# Patient Record
Sex: FEMALE | Race: WHITE | NOT HISPANIC OR LATINO | Employment: FULL TIME | URBAN - METROPOLITAN AREA
[De-identification: names, ages, dates, MRNs, and addresses within clinical notes are randomized per-mention and may not be internally consistent; named-entity substitution may affect disease eponyms.]

---

## 2023-01-24 PROBLEM — M77.11 LATERAL EPICONDYLITIS OF RIGHT ELBOW: Status: ACTIVE | Noted: 2023-01-24

## 2023-01-24 PROBLEM — M75.21 BICEPS TENDINITIS OF RIGHT UPPER EXTREMITY: Status: ACTIVE | Noted: 2023-01-24

## 2023-01-24 PROBLEM — R03.0 ELEVATED BLOOD PRESSURE READING WITHOUT DIAGNOSIS OF HYPERTENSION: Status: ACTIVE | Noted: 2023-01-24

## 2023-01-24 PROBLEM — K43.9 VENTRAL HERNIA WITHOUT OBSTRUCTION OR GANGRENE: Status: ACTIVE | Noted: 2023-01-24

## 2023-01-24 PROBLEM — L70.0 CLOSED COMEDONE: Status: ACTIVE | Noted: 2023-01-24

## 2023-01-24 PROBLEM — M25.511 CHRONIC RIGHT SHOULDER PAIN: Status: ACTIVE | Noted: 2023-01-24

## 2023-01-24 PROBLEM — R00.0 SINUS TACHYCARDIA: Status: ACTIVE | Noted: 2023-01-24

## 2023-01-24 PROBLEM — N39.0 ACUTE UTI: Status: ACTIVE | Noted: 2023-01-24

## 2023-01-24 PROBLEM — F33.2 SEVERE EPISODE OF RECURRENT MAJOR DEPRESSIVE DISORDER, WITHOUT PSYCHOTIC FEATURES (MULTI): Status: ACTIVE | Noted: 2023-01-24

## 2023-01-24 PROBLEM — R19.8 ANAL SYMPTOMS: Status: ACTIVE | Noted: 2023-01-24

## 2023-01-24 PROBLEM — R30.0 DYSURIA: Status: ACTIVE | Noted: 2023-01-24

## 2023-01-24 PROBLEM — M89.8X1 PAIN OF LEFT CLAVICLE: Status: ACTIVE | Noted: 2023-01-24

## 2023-01-24 PROBLEM — K21.9 GERD (GASTROESOPHAGEAL REFLUX DISEASE): Status: ACTIVE | Noted: 2023-01-24

## 2023-01-24 PROBLEM — L25.5 RHUS DERMATITIS: Status: ACTIVE | Noted: 2023-01-24

## 2023-01-24 PROBLEM — F90.9 ATTENTION-DEFICIT/HYPERACTIVITY DISORDER: Status: ACTIVE | Noted: 2023-01-24

## 2023-01-24 PROBLEM — N95.1 HOT FLASHES, MENOPAUSAL: Status: ACTIVE | Noted: 2023-01-24

## 2023-01-24 PROBLEM — R79.89 LOW TSH LEVEL: Status: ACTIVE | Noted: 2023-01-24

## 2023-01-24 PROBLEM — G89.29 CHRONIC RIGHT SHOULDER PAIN: Status: ACTIVE | Noted: 2023-01-24

## 2023-01-24 PROBLEM — R63.5 WEIGHT GAIN: Status: ACTIVE | Noted: 2023-01-24

## 2023-01-24 RX ORDER — FAMOTIDINE 20 MG/1
1 TABLET, FILM COATED ORAL 2 TIMES DAILY
Status: ON HOLD | COMMUNITY
Start: 2021-09-15 | End: 2023-12-22 | Stop reason: WASHOUT

## 2023-01-24 RX ORDER — DEXTROAMPHETAMINE SACCHARATE, AMPHETAMINE ASPARTATE MONOHYDRATE, DEXTROAMPHETAMINE SULFATE AND AMPHETAMINE SULFATE 5; 5; 5; 5 MG/1; MG/1; MG/1; MG/1
20 CAPSULE, EXTENDED RELEASE ORAL DAILY
COMMUNITY
Start: 2014-03-05 | End: 2023-03-08 | Stop reason: ALTCHOICE

## 2023-01-24 RX ORDER — MULTIVITAMIN
TABLET ORAL
Status: ON HOLD | COMMUNITY
End: 2023-12-22 | Stop reason: WASHOUT

## 2023-01-24 RX ORDER — CONJUGATED ESTROGENS AND MEDROXYPROGESTERONE ACETATE .45; 1.5 MG/1; MG/1
1 TABLET, SUGAR COATED ORAL DAILY
COMMUNITY
Start: 2019-08-29 | End: 2023-03-08 | Stop reason: ALTCHOICE

## 2023-03-08 ENCOUNTER — OFFICE VISIT (OUTPATIENT)
Dept: PRIMARY CARE | Facility: CLINIC | Age: 58
End: 2023-03-08
Payer: COMMERCIAL

## 2023-03-08 VITALS
WEIGHT: 174 LBS | HEART RATE: 100 BPM | HEIGHT: 63 IN | SYSTOLIC BLOOD PRESSURE: 164 MMHG | BODY MASS INDEX: 30.83 KG/M2 | DIASTOLIC BLOOD PRESSURE: 100 MMHG

## 2023-03-08 DIAGNOSIS — F98.8 ATTENTION DEFICIT DISORDER (ADD) IN ADULT: Primary | ICD-10-CM

## 2023-03-08 DIAGNOSIS — Z12.31 VISIT FOR SCREENING MAMMOGRAM: ICD-10-CM

## 2023-03-08 DIAGNOSIS — R03.0 ELEVATED BLOOD PRESSURE READING IN OFFICE WITHOUT DIAGNOSIS OF HYPERTENSION: ICD-10-CM

## 2023-03-08 PROCEDURE — 99213 OFFICE O/P EST LOW 20 MIN: CPT | Performed by: INTERNAL MEDICINE

## 2023-03-08 RX ORDER — DEXMETHYLPHENIDATE HYDROCHLORIDE 10 MG/1
20 CAPSULE, EXTENDED RELEASE ORAL DAILY
Qty: 60 CAPSULE | Refills: 0 | Status: SHIPPED | OUTPATIENT
Start: 2023-03-08 | End: 2023-09-21 | Stop reason: RX

## 2023-03-08 NOTE — PROGRESS NOTES
"Subjective   Stephanie Jones is a 57 y.o. female who presents for follow up   [facility]  She has been off of her adderal for about 5 days  She feels like she is in a fog since she has been off her adderall  Mild headache, no dzziness      Review of Systems    Objective   BP (!) 164/100   Ht 1.588 m (5' 2.5\")   Wt 78.9 kg (174 lb)   BMI 31.32 kg/m²    Physical Exam  Her repeat bp with manual cuff in LUE was 130/90  HEENT: normal  CV: reg Si/S2 no murmur  LUNGS: CTAB    Assessment/Plan   ADD: chg to focalin ( dexmethylphenidate) 10 mg, can take one or two at one time.Her OARRS was reviewed and was  no discrepancy. A  CSA for amphetamine is in other chart from 01/19/2022,  and she has had a urine drug screen that had appropriate findings from 11/10/2022. I am going to do an ageement in [facility] at her next visit.  2,. Elev bp w/o htn: check bp periodialy. Needs to get labs done that have already been ordered.   Problem List Items Addressed This Visit    None         "

## 2023-03-08 NOTE — PATIENT INSTRUCTIONS
Begin Dexmethylphenidate ( focalin) .  You are to get 60 pills, so if the 2 of them seems too much you can just take one at at time.  50% of the dose is released in the first 2 hours and the remainder is released over about 8 to 10hours.  Try to check your blood pressures at home.   See me again in 4 months.  Please get blood tests done, after fasting.  Lab is open M-F 7 am to 4 pm and Sat 8am to 12 pm  No appointment is needed

## 2023-07-06 ENCOUNTER — LAB (OUTPATIENT)
Dept: LAB | Facility: LAB | Age: 58
End: 2023-07-06
Payer: COMMERCIAL

## 2023-07-06 ENCOUNTER — OFFICE VISIT (OUTPATIENT)
Dept: PRIMARY CARE | Facility: CLINIC | Age: 58
End: 2023-07-06
Payer: COMMERCIAL

## 2023-07-06 VITALS
BODY MASS INDEX: 32.6 KG/M2 | DIASTOLIC BLOOD PRESSURE: 90 MMHG | HEIGHT: 63 IN | SYSTOLIC BLOOD PRESSURE: 140 MMHG | HEART RATE: 88 BPM | WEIGHT: 184 LBS

## 2023-07-06 DIAGNOSIS — Z12.31 VISIT FOR SCREENING MAMMOGRAM: ICD-10-CM

## 2023-07-06 DIAGNOSIS — Z79.899 MEDICATION MANAGEMENT: ICD-10-CM

## 2023-07-06 DIAGNOSIS — R03.0 ELEVATED BLOOD PRESSURE READING IN OFFICE WITHOUT DIAGNOSIS OF HYPERTENSION: ICD-10-CM

## 2023-07-06 DIAGNOSIS — R63.5 WEIGHT GAIN: ICD-10-CM

## 2023-07-06 DIAGNOSIS — R15.9 FUNCTIONAL FECAL INCONTINENCE: ICD-10-CM

## 2023-07-06 DIAGNOSIS — R14.0 ABDOMINAL BLOATING: ICD-10-CM

## 2023-07-06 DIAGNOSIS — L30.9 ECZEMA, UNSPECIFIED TYPE: ICD-10-CM

## 2023-07-06 DIAGNOSIS — E55.9 VITAMIN D DEFICIENCY: ICD-10-CM

## 2023-07-06 DIAGNOSIS — K21.9 GASTROESOPHAGEAL REFLUX DISEASE, UNSPECIFIED WHETHER ESOPHAGITIS PRESENT: Primary | ICD-10-CM

## 2023-07-06 DIAGNOSIS — F98.8 ATTENTION DEFICIT DISORDER (ADD) IN ADULT: ICD-10-CM

## 2023-07-06 PROCEDURE — 80307 DRUG TEST PRSMV CHEM ANLYZR: CPT

## 2023-07-06 PROCEDURE — 80324 DRUG SCREEN AMPHETAMINES 1/2: CPT

## 2023-07-06 PROCEDURE — 36415 COLL VENOUS BLD VENIPUNCTURE: CPT

## 2023-07-06 PROCEDURE — 82306 VITAMIN D 25 HYDROXY: CPT

## 2023-07-06 PROCEDURE — 85025 COMPLETE CBC W/AUTO DIFF WBC: CPT

## 2023-07-06 PROCEDURE — 80061 LIPID PANEL: CPT

## 2023-07-06 PROCEDURE — 80053 COMPREHEN METABOLIC PANEL: CPT

## 2023-07-06 PROCEDURE — 84439 ASSAY OF FREE THYROXINE: CPT

## 2023-07-06 PROCEDURE — 84443 ASSAY THYROID STIM HORMONE: CPT

## 2023-07-06 PROCEDURE — 99214 OFFICE O/P EST MOD 30 MIN: CPT | Performed by: INTERNAL MEDICINE

## 2023-07-06 PROCEDURE — 1036F TOBACCO NON-USER: CPT | Performed by: INTERNAL MEDICINE

## 2023-07-06 RX ORDER — DEXTROAMPHETAMINE SACCHARATE, AMPHETAMINE ASPARTATE MONOHYDRATE, DEXTROAMPHETAMINE SULFATE AND AMPHETAMINE SULFATE 5; 5; 5; 5 MG/1; MG/1; MG/1; MG/1
20 CAPSULE, EXTENDED RELEASE ORAL EVERY MORNING
Qty: 30 CAPSULE | Refills: 0 | Status: SHIPPED | OUTPATIENT
Start: 2023-07-06 | End: 2023-09-21 | Stop reason: SDUPTHER

## 2023-07-06 RX ORDER — OMEPRAZOLE 20 MG/1
20 TABLET, DELAYED RELEASE ORAL
Status: ON HOLD | COMMUNITY
End: 2023-12-22 | Stop reason: WASHOUT

## 2023-07-06 RX ORDER — TRIAMCINOLONE ACETONIDE 0.25 MG/G
1 CREAM TOPICAL 3 TIMES DAILY
Qty: 89 G | Refills: 0 | Status: SHIPPED | OUTPATIENT
Start: 2023-07-06

## 2023-07-06 RX ORDER — DEXTROAMPHETAMINE SACCHARATE, AMPHETAMINE ASPARTATE MONOHYDRATE, DEXTROAMPHETAMINE SULFATE AND AMPHETAMINE SULFATE 5; 5; 5; 5 MG/1; MG/1; MG/1; MG/1
20 CAPSULE, EXTENDED RELEASE ORAL EVERY MORNING
Qty: 30 CAPSULE | Refills: 0 | Status: SHIPPED | OUTPATIENT
Start: 2023-07-06 | End: 2023-07-06 | Stop reason: SDUPTHER

## 2023-07-06 ASSESSMENT — PATIENT HEALTH QUESTIONNAIRE - PHQ9
SUM OF ALL RESPONSES TO PHQ9 QUESTIONS 1 AND 2: 0
2. FEELING DOWN, DEPRESSED OR HOPELESS: NOT AT ALL
1. LITTLE INTEREST OR PLEASURE IN DOING THINGS: NOT AT ALL

## 2023-07-06 ASSESSMENT — PAIN SCALES - GENERAL: PAINLEVEL: 0-NO PAIN

## 2023-07-06 NOTE — PROGRESS NOTES
Subjective   Stephanie Jones is a 57 y.o. female who presents for Follow-up (4 mo follow up visit).    She was not able to get focalin.  She last took a med for ADD since march.  She has noticed decreased focus at work and would really like to restart it.     She is concerned that she has gained 10 pounds since seeing me     He has been having some fecal incontinence when she is more active  She has been using extra toilet paper to stop leakage  Will have leakage at inopportune times  Normally does not have formed stool  She is aware of when she needs to have a bm  She usually has a bm twice a day    OARRS:  Macey Almaguer, DO on 7/6/2023  3:06 PM  I have personally reviewed the OARRS report for Stephanie Jones. I have considered the risks of abuse, dependence, addiction and diversion and I believe that it is clinically appropriate for Stephanie Jones to be prescribed this medication    Is the patient prescribed a combination of a benzodiazepine and opioid?  No    Last Urine Drug Screen / ordered today: No  Recent Results (from the past 43445 hour(s))   Drug Screen, Urine With Reflex to Confirmation    Collection Time: 07/06/23  4:12 PM   Result Value Ref Range    DRUG SCREEN COMMENT URINE SEE BELOW     Amphetamine Screen, Urine PRESUMPTIVE NEGATIVE NEGATIVE    Barbiturate Screen, Urine PRESUMPTIVE NEGATIVE NEGATIVE    BENZODIAZEPINE (PRESENCE) IN URINE BY SCREEN METHOD PRESUMPTIVE NEGATIVE NEGATIVE    Cannabinoid Screen, Urine PRESUMPTIVE NEGATIVE NEGATIVE    Cocaine Screen, Urine PRESUMPTIVE NEGATIVE NEGATIVE    Fentanyl, Ur PRESUMPTIVE NEGATIVE NEGATIVE    Methadone Screen, Urine PRESUMPTIVE NEGATIVE NEGATIVE    Opiate Screen, Urine PRESUMPTIVE NEGATIVE NEGATIVE    Oxycodone Screen, Ur PRESUMPTIVE NEGATIVE NEGATIVE    PCP Screen, Urine PRESUMPTIVE NEGATIVE NEGATIVE   Amphetamine Confirm, Urine    Collection Time: 07/06/23  4:12 PM   Result Value Ref Range    Methamphetamine Quant, Ur <200 ng/mL    MDA, Urine <200  ng/mL    MDEA, Urine <200 ng/mL    Phentermine,Urine <200 ng/mL    Amphetamines,Urine <50 ng/mL    MDMA, Urine <200 ng/mL   OPIATE/OPIOID/BENZO PRESCRIPTION COMPLIANCE    Collection Time: 09/07/21  2:52 PM   Result Value Ref Range    DRUG SCREEN COMMENT URINE SEE BELOW     Creatine, Urine 104.2 mg/dL    Amphetamine Screen, Urine PRESUMPTIVE POSITIVE (A) NEGATIVE    Barbiturate Screen, Urine PRESUMPTIVE NEGATIVE NEGATIVE    Cannabinoid Screen, Urine PRESUMPTIVE NEGATIVE NEGATIVE    Cocaine Screen, Urine PRESUMPTIVE NEGATIVE NEGATIVE    PCP Screen, Urine PRESUMPTIVE NEGATIVE NEGATIVE    7-Aminoclonazepam <25 Cutoff <25 ng/mL    Alpha-Hydroxyalprazolam <25 Cutoff <25 ng/mL    Alpha-Hydroxymidazolam <25 Cutoff <25 ng/mL    Alprazolam <25 Cutoff <25 ng/mL    Chlordiazepoxide <25 Cutoff <25 ng/mL    Clonazepam <25 Cutoff <25 ng/mL    Diazepam <25 Cutoff <25 ng/mL    Lorazepam <25 Cutoff <25 ng/mL    Midazolam <25 Cutoff <25 ng/mL    Nordiazepam <25 Cutoff <25 ng/mL    Oxazepam <25 Cutoff <25 ng/mL    Temazepam <25 Cutoff <25 ng/mL    Zolpidem <25 Cutoff <25 ng/mL    Zolpidem Metabolite (ZCA) <25 Cutoff <25 ng/mL    6-Acetylmorphine <25 Cutoff <25 ng/mL    Codeine <50 Cutoff <50 ng/mL    Hydrocodone <25 Cutoff <25 ng/mL    Hydromorphone <25 Cutoff <25 ng/mL    Morphine Urine <50 Cutoff <50 ng/mL    Norhydrocodone <25 Cutoff <25 ng/mL    Noroxycodone <25 Cutoff <25 ng/mL    Oxycodone <25 Cutoff <25 ng/mL    Oxymorphone <25 Cutoff <25 ng/mL    Tramadol <50 Cutoff <50 ng/mL    O-Desmethyltramadol <50 Cutoff <50 ng/mL    Fentanyl <2.5 Cutoff<2.5 ng/mL    Norfentanyl <2.5 Cutoff<2.5 ng/mL    METHADONE CONFIRMATION,URINE <25 Cutoff <25 ng/mL    EDDP <25 Cutoff <25 ng/mL     Results are as expected.     Controlled Substance Agreement:  Date of the Last Agreement: 7/6/2023  Reviewed Controlled Substance Agreement including but not limited to the benefits, risks, and alternatives to treatment with a Controlled Substance  "medication(s).    Stimulants:   What is the patient's goal of therapy? Tx of Adult ADHD  Is this being achieved with current treatment? yes    Activities of Daily Living:   Is your overall impression that this patient is benefiting (symptom reduction outweighs side effects) from stimulant therapy? Yes     1. Physical Functioning: Same  2. Family Relationship: Better  3. Social Relationship: Better  4. Mood: Better  5. Sleep Patterns: Same  6. Overall Function: Better      Review of Systems    Objective   /90 (BP Location: Left arm, Patient Position: Sitting, BP Cuff Size: Large adult)   Pulse 88   Ht 1.588 m (5' 2.5\")   Wt 83.5 kg (184 lb)   BMI 33.12 kg/m²    Physical Exam    Assessment/Plan   Problem List Items Addressed This Visit    None  Visit Diagnoses       Attention deficit disorder (ADD) in adult    rx for Adderall XR sent. CSA and UDS completed today.     Elevated blood pressure reading in office without diagnosis of hypertension  monitor at visit.   Abnormal result of thyroid function test: check thyroid ultrasound, as had been low previously as well.     Hypercalcemia: check PTH  Abdominal bloating: recommend increasing fiber and IB shelli. Refer to Dr Kirk Jaime GI  Esophageal reflux: recommended otc omeprazole one daily  Fecal incontinence: she is troubled by this. Increase fiber. Recommend discuss with dr jaime. May need other referral depending on progress.  See me in 2 mo.                "

## 2023-07-06 NOTE — PATIENT INSTRUCTIONS
I recommend trying IB Russel which is a peppermint oil capsule which calms the cut.  Restart omeprazole over the counter.  Could try Citrucel ( mehtlycelllose) that can add bulk to your stool with less gas.   Schedule with Dr Jaime for GI.    Call if you can't get the Adderal XR and I will send in the regular release form.    Use triamcinlone ointment for your feet.     Schedule mammogram.    See me in 2 months.

## 2023-07-07 LAB
ALANINE AMINOTRANSFERASE (SGPT) (U/L) IN SER/PLAS: 20 U/L (ref 7–45)
ALBUMIN (G/DL) IN SER/PLAS: 4.4 G/DL (ref 3.4–5)
ALKALINE PHOSPHATASE (U/L) IN SER/PLAS: 128 U/L (ref 33–110)
AMPHETAMINE (PRESENCE) IN URINE BY SCREEN METHOD: NORMAL
ANION GAP IN SER/PLAS: 15 MMOL/L (ref 10–20)
ASPARTATE AMINOTRANSFERASE (SGOT) (U/L) IN SER/PLAS: 19 U/L (ref 9–39)
BARBITURATES PRESENCE IN URINE BY SCREEN METHOD: NORMAL
BASOPHILS (10*3/UL) IN BLOOD BY AUTOMATED COUNT: 0.08 X10E9/L (ref 0–0.1)
BASOPHILS/100 LEUKOCYTES IN BLOOD BY AUTOMATED COUNT: 0.8 % (ref 0–2)
BENZODIAZEPINE (PRESENCE) IN URINE BY SCREEN METHOD: NORMAL
BILIRUBIN TOTAL (MG/DL) IN SER/PLAS: 0.6 MG/DL (ref 0–1.2)
CALCIDIOL (25 OH VITAMIN D3) (NG/ML) IN SER/PLAS: 40 NG/ML
CALCIUM (MG/DL) IN SER/PLAS: 11 MG/DL (ref 8.6–10.6)
CANNABINOIDS IN URINE BY SCREEN METHOD: NORMAL
CARBON DIOXIDE, TOTAL (MMOL/L) IN SER/PLAS: 27 MMOL/L (ref 21–32)
CHLORIDE (MMOL/L) IN SER/PLAS: 103 MMOL/L (ref 98–107)
CHOLESTEROL (MG/DL) IN SER/PLAS: 240 MG/DL (ref 0–199)
CHOLESTEROL IN HDL (MG/DL) IN SER/PLAS: 81.9 MG/DL
CHOLESTEROL/HDL RATIO: 2.9
COCAINE (PRESENCE) IN URINE BY SCREEN METHOD: NORMAL
CREATININE (MG/DL) IN SER/PLAS: 0.64 MG/DL (ref 0.5–1.05)
DRUG SCREEN COMMENT URINE: NORMAL
EOSINOPHILS (10*3/UL) IN BLOOD BY AUTOMATED COUNT: 0.26 X10E9/L (ref 0–0.7)
EOSINOPHILS/100 LEUKOCYTES IN BLOOD BY AUTOMATED COUNT: 2.7 % (ref 0–6)
ERYTHROCYTE DISTRIBUTION WIDTH (RATIO) BY AUTOMATED COUNT: 13.2 % (ref 11.5–14.5)
ERYTHROCYTE MEAN CORPUSCULAR HEMOGLOBIN CONCENTRATION (G/DL) BY AUTOMATED: 32.2 G/DL (ref 32–36)
ERYTHROCYTE MEAN CORPUSCULAR VOLUME (FL) BY AUTOMATED COUNT: 88 FL (ref 80–100)
ERYTHROCYTES (10*6/UL) IN BLOOD BY AUTOMATED COUNT: 5.42 X10E12/L (ref 4–5.2)
FENTANYL URINE: NORMAL
GFR FEMALE: >90 ML/MIN/1.73M2
GLUCOSE (MG/DL) IN SER/PLAS: 95 MG/DL (ref 74–99)
HEMATOCRIT (%) IN BLOOD BY AUTOMATED COUNT: 47.5 % (ref 36–46)
HEMOGLOBIN (G/DL) IN BLOOD: 15.3 G/DL (ref 12–16)
IMMATURE GRANULOCYTES/100 LEUKOCYTES IN BLOOD BY AUTOMATED COUNT: 0.2 % (ref 0–0.9)
LDL: 136 MG/DL (ref 0–99)
LEUKOCYTES (10*3/UL) IN BLOOD BY AUTOMATED COUNT: 9.6 X10E9/L (ref 4.4–11.3)
LYMPHOCYTES (10*3/UL) IN BLOOD BY AUTOMATED COUNT: 4.2 X10E9/L (ref 1.2–4.8)
LYMPHOCYTES/100 LEUKOCYTES IN BLOOD BY AUTOMATED COUNT: 44 % (ref 13–44)
METHADONE (PRESENCE) IN URINE BY SCREEN METHOD: NORMAL
MONOCYTES (10*3/UL) IN BLOOD BY AUTOMATED COUNT: 0.76 X10E9/L (ref 0.1–1)
MONOCYTES/100 LEUKOCYTES IN BLOOD BY AUTOMATED COUNT: 8 % (ref 2–10)
NEUTROPHILS (10*3/UL) IN BLOOD BY AUTOMATED COUNT: 4.23 X10E9/L (ref 1.2–7.7)
NEUTROPHILS/100 LEUKOCYTES IN BLOOD BY AUTOMATED COUNT: 44.3 % (ref 40–80)
NRBC (PER 100 WBCS) BY AUTOMATED COUNT: 0 /100 WBC (ref 0–0)
OPIATES (PRESENCE) IN URINE BY SCREEN METHOD: NORMAL
OXYCODONE (PRESENCE) IN URINE BY SCREEN METHOD: NORMAL
PHENCYCLIDINE (PRESENCE) IN URINE BY SCREEN METHOD: NORMAL
PLATELETS (10*3/UL) IN BLOOD AUTOMATED COUNT: 376 X10E9/L (ref 150–450)
POTASSIUM (MMOL/L) IN SER/PLAS: 5 MMOL/L (ref 3.5–5.3)
PROTEIN TOTAL: 7.2 G/DL (ref 6.4–8.2)
SODIUM (MMOL/L) IN SER/PLAS: 140 MMOL/L (ref 136–145)
THYROTROPIN (MIU/L) IN SER/PLAS BY DETECTION LIMIT <= 0.05 MIU/L: <0.01 MIU/L (ref 0.44–3.98)
THYROXINE (T4) FREE (NG/DL) IN SER/PLAS: 1.14 NG/DL (ref 0.78–1.48)
TRIGLYCERIDE (MG/DL) IN SER/PLAS: 109 MG/DL (ref 0–149)
UREA NITROGEN (MG/DL) IN SER/PLAS: 11 MG/DL (ref 6–23)
VLDL: 22 MG/DL (ref 0–40)

## 2023-07-10 LAB
AMPHETAMINES,URINE: <50 NG/ML
MDA,URINE: <200 NG/ML
MDEA,URINE: <200 NG/ML
MDMA,UR: <200 NG/ML
METHAMPHETAMINE QUANTITATIVE URINE: <200 NG/ML
PHENTERMINE,UR: <200 NG/ML

## 2023-07-12 ENCOUNTER — TELEPHONE (OUTPATIENT)
Dept: PRIMARY CARE | Facility: CLINIC | Age: 58
End: 2023-07-12
Payer: COMMERCIAL

## 2023-07-12 DIAGNOSIS — E83.52 SERUM CALCIUM ELEVATED: Primary | ICD-10-CM

## 2023-07-12 DIAGNOSIS — R79.89 LOW TSH LEVEL: ICD-10-CM

## 2023-07-12 NOTE — TELEPHONE ENCOUNTER
----- Message from Macey Almaguer DO sent at 7/12/2023 12:01 PM EDT -----  Call her and tell her that her blood tests show she might have HYPER thyroidism, which is where there is too much thyroid hormone. This can cause both fatigue and anxiety. I want her to get additonal blood testingn and also an ultrasound of her thyroid. Her calcium level was also slightly high, so I will check a blood test to investigate this. She should stop any Tums or calcium supplements. Her chlolesterol was also borderline high. We will let her know her test results when they come back. Give her the number to schedule her thyroid ultrasound.

## 2023-07-12 NOTE — TELEPHONE ENCOUNTER
Pt called back and I gave her message. Gave her the number to schedule thyroid ultrasound-[phone]-opt [phone]. She will be going to get her blood work done as well.   Pt did not have any questions or concerns.

## 2023-08-19 DIAGNOSIS — E04.1 THYROID NODULE GREATER THAN OR EQUAL TO 1.5 CM IN DIAMETER INCIDENTALLY NOTED ON IMAGING STUDY: Primary | ICD-10-CM

## 2023-08-21 ENCOUNTER — TELEPHONE (OUTPATIENT)
Dept: PRIMARY CARE | Facility: CLINIC | Age: 58
End: 2023-08-21
Payer: COMMERCIAL

## 2023-08-21 NOTE — TELEPHONE ENCOUNTER
----- Message from Macey Almaguer DO sent at 8/19/2023 10:10 PM EDT -----   Notify her that her thyroid ultrasound showed she has a few thyroid nodules. One of the nodules needs to be biopsied.  I am referring her to Dr Timothy Antonio, who is a surgeon, for the thyroid biopsy.

## 2023-08-30 ENCOUNTER — OFFICE (OUTPATIENT)
Dept: URBAN - METROPOLITAN AREA CLINIC 27 | Facility: CLINIC | Age: 58
End: 2023-08-30

## 2023-08-30 VITALS
HEIGHT: 62 IN | DIASTOLIC BLOOD PRESSURE: 92 MMHG | TEMPERATURE: 97.5 F | WEIGHT: 186 LBS | SYSTOLIC BLOOD PRESSURE: 146 MMHG | HEART RATE: 98 BPM

## 2023-08-30 DIAGNOSIS — R79.89 OTHER SPECIFIED ABNORMAL FINDINGS OF BLOOD CHEMISTRY: ICD-10-CM

## 2023-08-30 DIAGNOSIS — K21.9 GASTRO-ESOPHAGEAL REFLUX DISEASE WITHOUT ESOPHAGITIS: ICD-10-CM

## 2023-08-30 DIAGNOSIS — R14.0 ABDOMINAL DISTENSION (GASEOUS): ICD-10-CM

## 2023-08-30 PROCEDURE — 99203 OFFICE O/P NEW LOW 30 MIN: CPT | Performed by: INTERNAL MEDICINE

## 2023-09-11 VITALS
SYSTOLIC BLOOD PRESSURE: 126 MMHG | RESPIRATION RATE: 9 BRPM | HEIGHT: 62 IN | HEART RATE: 91 BPM | RESPIRATION RATE: 11 BRPM | HEART RATE: 101 BPM | HEART RATE: 93 BPM | RESPIRATION RATE: 26 BRPM | SYSTOLIC BLOOD PRESSURE: 118 MMHG | RESPIRATION RATE: 9 BRPM | RESPIRATION RATE: 11 BRPM | RESPIRATION RATE: 18 BRPM | HEART RATE: 89 BPM | DIASTOLIC BLOOD PRESSURE: 102 MMHG | OXYGEN SATURATION: 99 % | OXYGEN SATURATION: 95 % | HEART RATE: 91 BPM | OXYGEN SATURATION: 95 % | SYSTOLIC BLOOD PRESSURE: 160 MMHG | RESPIRATION RATE: 18 BRPM | HEART RATE: 91 BPM | HEART RATE: 89 BPM | DIASTOLIC BLOOD PRESSURE: 102 MMHG | HEART RATE: 88 BPM | HEART RATE: 82 BPM | SYSTOLIC BLOOD PRESSURE: 133 MMHG | OXYGEN SATURATION: 98 % | RESPIRATION RATE: 27 BRPM | DIASTOLIC BLOOD PRESSURE: 102 MMHG | SYSTOLIC BLOOD PRESSURE: 144 MMHG | HEART RATE: 82 BPM | RESPIRATION RATE: 16 BRPM | SYSTOLIC BLOOD PRESSURE: 207 MMHG | OXYGEN SATURATION: 98 % | SYSTOLIC BLOOD PRESSURE: 126 MMHG | OXYGEN SATURATION: 96 % | OXYGEN SATURATION: 96 % | HEART RATE: 107 BPM | HEART RATE: 81 BPM | HEART RATE: 101 BPM | HEART RATE: 101 BPM | RESPIRATION RATE: 27 BRPM | SYSTOLIC BLOOD PRESSURE: 126 MMHG | HEIGHT: 62 IN | SYSTOLIC BLOOD PRESSURE: 121 MMHG | DIASTOLIC BLOOD PRESSURE: 79 MMHG | DIASTOLIC BLOOD PRESSURE: 81 MMHG | HEART RATE: 82 BPM | SYSTOLIC BLOOD PRESSURE: 207 MMHG | TEMPERATURE: 98.8 F | OXYGEN SATURATION: 99 % | TEMPERATURE: 98.8 F | DIASTOLIC BLOOD PRESSURE: 80 MMHG | HEART RATE: 81 BPM | SYSTOLIC BLOOD PRESSURE: 121 MMHG | SYSTOLIC BLOOD PRESSURE: 169 MMHG | DIASTOLIC BLOOD PRESSURE: 135 MMHG | SYSTOLIC BLOOD PRESSURE: 160 MMHG | TEMPERATURE: 98.8 F | HEART RATE: 93 BPM | DIASTOLIC BLOOD PRESSURE: 80 MMHG | HEART RATE: 81 BPM | SYSTOLIC BLOOD PRESSURE: 144 MMHG | DIASTOLIC BLOOD PRESSURE: 79 MMHG | WEIGHT: 185 LBS | OXYGEN SATURATION: 94 % | SYSTOLIC BLOOD PRESSURE: 169 MMHG | RESPIRATION RATE: 16 BRPM | HEART RATE: 88 BPM | OXYGEN SATURATION: 94 % | OXYGEN SATURATION: 99 % | HEART RATE: 107 BPM | HEART RATE: 107 BPM | RESPIRATION RATE: 22 BRPM | SYSTOLIC BLOOD PRESSURE: 207 MMHG | SYSTOLIC BLOOD PRESSURE: 118 MMHG | RESPIRATION RATE: 16 BRPM | DIASTOLIC BLOOD PRESSURE: 135 MMHG | OXYGEN SATURATION: 95 % | SYSTOLIC BLOOD PRESSURE: 133 MMHG | DIASTOLIC BLOOD PRESSURE: 80 MMHG | WEIGHT: 185 LBS | RESPIRATION RATE: 27 BRPM | RESPIRATION RATE: 26 BRPM | DIASTOLIC BLOOD PRESSURE: 135 MMHG | HEART RATE: 89 BPM | WEIGHT: 185 LBS | SYSTOLIC BLOOD PRESSURE: 133 MMHG | SYSTOLIC BLOOD PRESSURE: 160 MMHG | RESPIRATION RATE: 9 BRPM | HEART RATE: 93 BPM | DIASTOLIC BLOOD PRESSURE: 81 MMHG | RESPIRATION RATE: 22 BRPM | SYSTOLIC BLOOD PRESSURE: 144 MMHG | OXYGEN SATURATION: 98 % | SYSTOLIC BLOOD PRESSURE: 118 MMHG | OXYGEN SATURATION: 94 % | SYSTOLIC BLOOD PRESSURE: 121 MMHG | DIASTOLIC BLOOD PRESSURE: 81 MMHG | HEART RATE: 88 BPM | RESPIRATION RATE: 26 BRPM | HEIGHT: 62 IN | DIASTOLIC BLOOD PRESSURE: 79 MMHG | RESPIRATION RATE: 18 BRPM | RESPIRATION RATE: 22 BRPM | SYSTOLIC BLOOD PRESSURE: 169 MMHG | OXYGEN SATURATION: 96 % | RESPIRATION RATE: 11 BRPM

## 2023-09-14 ENCOUNTER — AMBULATORY SURGICAL CENTER (OUTPATIENT)
Dept: URBAN - METROPOLITAN AREA SURGERY 12 | Facility: SURGERY | Age: 58
End: 2023-09-14

## 2023-09-14 ENCOUNTER — AMBULATORY SURGICAL CENTER (OUTPATIENT)
Dept: URBAN - METROPOLITAN AREA SURGERY 12 | Facility: SURGERY | Age: 58
End: 2023-09-14
Payer: COMMERCIAL

## 2023-09-14 ENCOUNTER — OFFICE (OUTPATIENT)
Dept: URBAN - METROPOLITAN AREA PATHOLOGY 2 | Facility: PATHOLOGY | Age: 58
End: 2023-09-14

## 2023-09-14 DIAGNOSIS — K21.9 GASTRO-ESOPHAGEAL REFLUX DISEASE WITHOUT ESOPHAGITIS: ICD-10-CM

## 2023-09-14 DIAGNOSIS — K29.80 DUODENITIS WITHOUT BLEEDING: ICD-10-CM

## 2023-09-14 DIAGNOSIS — K22.89 OTHER SPECIFIED DISEASE OF ESOPHAGUS: ICD-10-CM

## 2023-09-14 DIAGNOSIS — K44.9 DIAPHRAGMATIC HERNIA WITHOUT OBSTRUCTION OR GANGRENE: ICD-10-CM

## 2023-09-14 DIAGNOSIS — K31.7 POLYP OF STOMACH AND DUODENUM: ICD-10-CM

## 2023-09-14 DIAGNOSIS — R10.84 GENERALIZED ABDOMINAL PAIN: ICD-10-CM

## 2023-09-14 DIAGNOSIS — K29.50 UNSPECIFIED CHRONIC GASTRITIS WITHOUT BLEEDING: ICD-10-CM

## 2023-09-14 PROCEDURE — 43239 EGD BIOPSY SINGLE/MULTIPLE: CPT | Mod: 59 | Performed by: INTERNAL MEDICINE

## 2023-09-14 PROCEDURE — 43250 EGD CAUTERY TUMOR POLYP: CPT | Performed by: INTERNAL MEDICINE

## 2023-09-14 PROCEDURE — 88305 TISSUE EXAM BY PATHOLOGIST: CPT | Performed by: PATHOLOGY

## 2023-09-14 PROCEDURE — 88313 SPECIAL STAINS GROUP 2: CPT | Performed by: PATHOLOGY

## 2023-09-14 PROCEDURE — 88342 IMHCHEM/IMCYTCHM 1ST ANTB: CPT | Performed by: PATHOLOGY

## 2023-09-21 ENCOUNTER — OFFICE VISIT (OUTPATIENT)
Dept: PRIMARY CARE | Facility: CLINIC | Age: 58
End: 2023-09-21
Payer: COMMERCIAL

## 2023-09-21 VITALS
RESPIRATION RATE: 16 BRPM | BODY MASS INDEX: 34.08 KG/M2 | HEIGHT: 62 IN | WEIGHT: 185.2 LBS | DIASTOLIC BLOOD PRESSURE: 92 MMHG | SYSTOLIC BLOOD PRESSURE: 126 MMHG | HEART RATE: 105 BPM

## 2023-09-21 DIAGNOSIS — R79.89 LOW TSH LEVEL: ICD-10-CM

## 2023-09-21 DIAGNOSIS — F90.0 ATTENTION DEFICIT HYPERACTIVITY DISORDER (ADHD), PREDOMINANTLY INATTENTIVE TYPE: ICD-10-CM

## 2023-09-21 DIAGNOSIS — F98.8 ATTENTION DEFICIT DISORDER (ADD) IN ADULT: ICD-10-CM

## 2023-09-21 DIAGNOSIS — E04.1 THYROID NODULE: ICD-10-CM

## 2023-09-21 DIAGNOSIS — E83.52 HYPERCALCEMIA: Primary | ICD-10-CM

## 2023-09-21 DIAGNOSIS — R03.0 ELEVATED BLOOD PRESSURE READING WITHOUT DIAGNOSIS OF HYPERTENSION: ICD-10-CM

## 2023-09-21 PROCEDURE — 1036F TOBACCO NON-USER: CPT | Performed by: INTERNAL MEDICINE

## 2023-09-21 PROCEDURE — 99214 OFFICE O/P EST MOD 30 MIN: CPT | Performed by: INTERNAL MEDICINE

## 2023-09-21 RX ORDER — DEXTROAMPHETAMINE SACCHARATE, AMPHETAMINE ASPARTATE MONOHYDRATE, DEXTROAMPHETAMINE SULFATE AND AMPHETAMINE SULFATE 5; 5; 5; 5 MG/1; MG/1; MG/1; MG/1
20 CAPSULE, EXTENDED RELEASE ORAL EVERY MORNING
Qty: 30 CAPSULE | Refills: 0 | Status: SHIPPED | OUTPATIENT
Start: 2023-09-21 | End: 2023-11-17 | Stop reason: SDUPTHER

## 2023-09-21 ASSESSMENT — PATIENT HEALTH QUESTIONNAIRE - PHQ9
2. FEELING DOWN, DEPRESSED OR HOPELESS: NOT AT ALL
SUM OF ALL RESPONSES TO PHQ9 QUESTIONS 1 AND 2: 0
1. LITTLE INTEREST OR PLEASURE IN DOING THINGS: NOT AT ALL

## 2023-09-21 ASSESSMENT — PAIN SCALES - GENERAL: PAINLEVEL: 0-NO PAIN

## 2023-09-21 NOTE — PROGRESS NOTES
Subjective   Stephanie Jones is a 57 y.o. female who presents for Follow-up.    She saw dr brady and had an egd  She was to have a CT abdomen, needed to be rescheduled to next thrusday due to machine issue    She is scheduled for thyroid bx next week    Her daughter had hemorrhaging and she delivered 5 weeks early due to this   Was 6 lb / 6 oz; he is at NICU    OARRS:  Macey Almaguer DO on 9/21/2023  3:38 PM  I have personally reviewed the OARRS report for Stephanie Jones. I have considered the risks of abuse, dependence, addiction and diversion and I believe that it is clinically appropriate for Stephanie Jones to be prescribed this medication  OARRS reviewed 9/21/23  3:48 pm    Is the patient prescribed a combination of a benzodiazepine and opioid?  No    Last Urine Drug Screen / ordered today: Yes  Recent Results (from the past 8760 hour(s))   Drug Screen, Urine With Reflex to Confirmation    Collection Time: 07/06/23  4:12 PM   Result Value Ref Range    DRUG SCREEN COMMENT URINE SEE BELOW     Amphetamine Screen, Urine PRESUMPTIVE NEGATIVE NEGATIVE    Barbiturate Screen, Urine PRESUMPTIVE NEGATIVE NEGATIVE    BENZODIAZEPINE (PRESENCE) IN URINE BY SCREEN METHOD PRESUMPTIVE NEGATIVE NEGATIVE    Cannabinoid Screen, Urine PRESUMPTIVE NEGATIVE NEGATIVE    Cocaine Screen, Urine PRESUMPTIVE NEGATIVE NEGATIVE    Fentanyl, Ur PRESUMPTIVE NEGATIVE NEGATIVE    Methadone Screen, Urine PRESUMPTIVE NEGATIVE NEGATIVE    Opiate Screen, Urine PRESUMPTIVE NEGATIVE NEGATIVE    Oxycodone Screen, Ur PRESUMPTIVE NEGATIVE NEGATIVE    PCP Screen, Urine PRESUMPTIVE NEGATIVE NEGATIVE   Amphetamine Confirm, Urine    Collection Time: 07/06/23  4:12 PM   Result Value Ref Range    Methamphetamine Quant, Ur <200 ng/mL    MDA, Urine <200 ng/mL    MDEA, Urine <200 ng/mL    Phentermine,Urine <200 ng/mL    Amphetamines,Urine <50 ng/mL    MDMA, Urine <200 ng/mL     Results are as expected.         Controlled Substance Agreement:  Date of the Last  "Agreement: 7/6/2023  Reviewed Controlled Substance Agreement including but not limited to the benefits, risks, and alternatives to treatment with a Controlled Substance medication(s).    Stimulants:   What is the patient's goal of therapy? Treatment of ADD  Is this being achieved with current treatment? Yes. Med was unavailable for some time. She requests print.    Activities of Daily Living:   Is your overall impression that this patient is benefiting (symptom reduction outweighs side effects) from stimulant therapy? Yes     1. Physical Functioning: Same  2. Family Relationship: Same  3. Social Relationship: Same  4. Mood: Same  5. Sleep Patterns: Same  6. Overall Function: Same      Review of Systems    Objective   BP (!) 126/92 (BP Location: Left arm, Patient Position: Sitting, BP Cuff Size: Adult)   Pulse 105   Resp 16   Ht 1.575 m (5' 2\")   Wt 84 kg (185 lb 3.2 oz)   BMI 33.87 kg/m²    Physical Exam  Visit Vitals  BP (!) 126/92 (BP Location: Left arm, Patient Position: Sitting, BP Cuff Size: Adult)   Pulse 105   Resp 16   Ht 1.575 m (5' 2\")   Wt 84 kg (185 lb 3.2 oz)   BMI 33.87 kg/m²   Smoking Status Never   BSA 1.92 m²      GEN: NAD  HEENT: normal  NECK: no adenopathy, no thyroid enlargment  LUNGS: CTAB  CV: reg S1/S2 no murmurs  EXT: no leg edema   Assessment/Plan   Problem List Items Addressed This Visit       Low TSH level    Elevated blood pressure reading without diagnosis of hypertension     Other Visit Diagnoses       Hypercalcemia    -  recheck labs    Relevant Orders    Comprehensive Metabolic Panel    PTH, intact    Attention deficit disorder (ADD) in adult    she was given a printed RX to take due to availability  issues with this med.      Relevant Medications    amphetamine-dextroamphetamine XR (Adderall XR) 20 mg 24 hr capsule    Thyroid nodule    going to get biopsy.                "

## 2023-10-18 DIAGNOSIS — E04.2 MULTINODULAR THYROID: ICD-10-CM

## 2023-10-19 ENCOUNTER — OFFICE VISIT (OUTPATIENT)
Dept: PRIMARY CARE | Facility: CLINIC | Age: 58
End: 2023-10-19
Payer: COMMERCIAL

## 2023-10-19 VITALS
RESPIRATION RATE: 16 BRPM | HEIGHT: 62 IN | WEIGHT: 188 LBS | BODY MASS INDEX: 34.6 KG/M2 | DIASTOLIC BLOOD PRESSURE: 90 MMHG | HEART RATE: 85 BPM | SYSTOLIC BLOOD PRESSURE: 140 MMHG

## 2023-10-19 DIAGNOSIS — E83.52 HYPERCALCEMIA: Primary | ICD-10-CM

## 2023-10-19 DIAGNOSIS — F17.210 CIGARETTE NICOTINE DEPENDENCE WITHOUT COMPLICATION: ICD-10-CM

## 2023-10-19 DIAGNOSIS — E05.20 TOXIC MULTINODULAR GOITER: ICD-10-CM

## 2023-10-19 DIAGNOSIS — I10 BENIGN ESSENTIAL HTN: ICD-10-CM

## 2023-10-19 DIAGNOSIS — F17.210 CIGARETTE NICOTINE DEPENDENCE, UNCOMPLICATED: ICD-10-CM

## 2023-10-19 DIAGNOSIS — R91.8 LUNG NODULES: ICD-10-CM

## 2023-10-19 DIAGNOSIS — R73.09 ELEVATED GLUCOSE: ICD-10-CM

## 2023-10-19 PROCEDURE — 99214 OFFICE O/P EST MOD 30 MIN: CPT | Performed by: INTERNAL MEDICINE

## 2023-10-19 PROCEDURE — 3080F DIAST BP >= 90 MM HG: CPT | Performed by: INTERNAL MEDICINE

## 2023-10-19 PROCEDURE — 93000 ELECTROCARDIOGRAM COMPLETE: CPT | Performed by: INTERNAL MEDICINE

## 2023-10-19 PROCEDURE — 3077F SYST BP >= 140 MM HG: CPT | Performed by: INTERNAL MEDICINE

## 2023-10-19 PROCEDURE — 1036F TOBACCO NON-USER: CPT | Performed by: INTERNAL MEDICINE

## 2023-10-19 RX ORDER — LOSARTAN POTASSIUM 50 MG/1
50 TABLET ORAL DAILY
Qty: 30 TABLET | Refills: 2 | Status: SHIPPED | OUTPATIENT
Start: 2023-10-19 | End: 2024-02-22 | Stop reason: ALTCHOICE

## 2023-10-19 ASSESSMENT — PAIN SCALES - GENERAL: PAINLEVEL: 0-NO PAIN

## 2023-10-19 ASSESSMENT — PATIENT HEALTH QUESTIONNAIRE - PHQ9
1. LITTLE INTEREST OR PLEASURE IN DOING THINGS: NOT AT ALL
2. FEELING DOWN, DEPRESSED OR HOPELESS: NOT AT ALL
SUM OF ALL RESPONSES TO PHQ9 QUESTIONS 1 AND 2: 0

## 2023-10-19 NOTE — PATIENT INSTRUCTIONS
Schedule CT screening for lung cancer in the next 3 to 4 months.  No iv contrast, and lower dose of radiation.   Should have better coverage as is a screening test.    Get all the blood tests done that you need. My test is not a fasting test.    Begin losartan 50 mg one every morning with food.     Call me if you start to have face swelling or more pain call me and I can send in medication for you to treat infection until you can see a dentist.

## 2023-10-19 NOTE — PROGRESS NOTES
"Subjective   Stephanie Jones is a 57 y.o. female who presents for Follow-up (Pt following up with CT scan.).  [unfilled]    She stopped smoking 5 years ago  Had smoked her \"whole adult life\"  Nodule was 7 mm    She had a hard time getting things scheduled for her thyroid eval  Had been seen by Dr Antonio and felt to have subclinical hyperthyroidism  She was finally able to get a nuclear thyroid uptake scan scheduled for November  She did mention that her mother and her mother's father had thyroid removed      Review of Systems    Objective   /90 (BP Location: Left arm, Patient Position: Sitting, BP Cuff Size: Large adult)   Pulse 85   Resp 16   Ht 1.575 m (5' 2\")   Wt 85.3 kg (188 lb)   BMI 34.39 kg/m²    Physical Exam  Visit Vitals  /90 (BP Location: Left arm, Patient Position: Sitting, BP Cuff Size: Large adult)   Pulse 85   Resp 16   Ht 1.575 m (5' 2\")   Wt 85.3 kg (188 lb)   BMI 34.39 kg/m²   Smoking Status Never   BSA 1.93 m²      GEN: NAD  HEENT: normal  NECK: no adenopathy, no thyroid enlargment  LUNGS: CTAB  CV: reg S1/S2 no murmurs  EXT: no leg edema   Assessment/Plan   Problem List Items Addressed This Visit       Toxic multinodular goiter she is undergoing testing with nuclear thyroid scan through Dr Antonio.     Lung nodules 7mm nodule vs pleural tag in R posterior base. Will order CT screening of chest due to her longer smoking history.     Benign essential HTN begin losartan 50 mg daily.  EKG done as baseline was normal.     Relevant Medications    losartan (Cozaar) 50 mg tablet    Other Relevant Orders    ECG 12 lead (Clinic Performed) (Completed)    Ventral hernia:  her CT ordered for intermittent abdominal pain revealed  a 1.5 cm ventral hernia. She has had mesh in the past.      Other Visit Diagnoses       Hypercalcemia    -  Primary    Relevant Orders    Basic metabolic panel    PTH, intact    Cigarette nicotine dependence, uncomplicated        Relevant Orders    CT lung screening low " dose    Elevated glucose        Relevant Orders    Hemoglobin A1C (Completed)

## 2023-10-23 ENCOUNTER — LAB (OUTPATIENT)
Dept: LAB | Facility: LAB | Age: 58
End: 2023-10-23
Payer: COMMERCIAL

## 2023-10-23 ENCOUNTER — HOSPITAL ENCOUNTER (OUTPATIENT)
Dept: RADIOLOGY | Facility: CLINIC | Age: 58
Discharge: HOME | End: 2023-10-23
Payer: COMMERCIAL

## 2023-10-23 DIAGNOSIS — E04.2 NONTOXIC MULTINODULAR GOITER: ICD-10-CM

## 2023-10-23 DIAGNOSIS — R79.89 LOW TSH LEVEL: ICD-10-CM

## 2023-10-23 DIAGNOSIS — Z12.31 ENCOUNTER FOR SCREENING MAMMOGRAM FOR MALIGNANT NEOPLASM OF BREAST: ICD-10-CM

## 2023-10-23 DIAGNOSIS — R73.09 ELEVATED GLUCOSE: ICD-10-CM

## 2023-10-23 DIAGNOSIS — E83.52 SERUM CALCIUM ELEVATED: ICD-10-CM

## 2023-10-23 DIAGNOSIS — E83.52 HYPERCALCEMIA: ICD-10-CM

## 2023-10-23 DIAGNOSIS — R79.89 OTHER SPECIFIED ABNORMAL FINDINGS OF BLOOD CHEMISTRY: Primary | ICD-10-CM

## 2023-10-23 LAB
ALBUMIN SERPL BCP-MCNC: 4.6 G/DL (ref 3.4–5)
ALP SERPL-CCNC: 148 U/L (ref 33–110)
ALT SERPL W P-5'-P-CCNC: 17 U/L (ref 7–45)
ANION GAP SERPL CALC-SCNC: 14 MMOL/L (ref 10–20)
AST SERPL W P-5'-P-CCNC: 17 U/L (ref 9–39)
BILIRUB SERPL-MCNC: 0.7 MG/DL (ref 0–1.2)
BUN SERPL-MCNC: 11 MG/DL (ref 6–23)
CALCIUM SERPL-MCNC: 10.6 MG/DL (ref 8.6–10.6)
CHLORIDE SERPL-SCNC: 104 MMOL/L (ref 98–107)
CO2 SERPL-SCNC: 27 MMOL/L (ref 21–32)
CREAT SERPL-MCNC: 0.62 MG/DL (ref 0.5–1.05)
EST. AVERAGE GLUCOSE BLD GHB EST-MCNC: 117 MG/DL
GFR SERPL CREATININE-BSD FRML MDRD: >90 ML/MIN/1.73M*2
GLUCOSE SERPL-MCNC: 94 MG/DL (ref 74–99)
HBA1C MFR BLD: 5.7 %
POTASSIUM SERPL-SCNC: 4.4 MMOL/L (ref 3.5–5.3)
PROT SERPL-MCNC: 7.5 G/DL (ref 6.4–8.2)
PTH-INTACT SERPL-MCNC: 67.7 PG/ML (ref 18.5–88)
SODIUM SERPL-SCNC: 141 MMOL/L (ref 136–145)
T3FREE SERPL-MCNC: 5.7 PG/ML (ref 2.3–4.2)
THYROPEROXIDASE AB SERPL-ACNC: 43 IU/ML

## 2023-10-23 PROCEDURE — 86258 DGP ANTIBODY EACH IG CLASS: CPT

## 2023-10-23 PROCEDURE — 83970 ASSAY OF PARATHORMONE: CPT

## 2023-10-23 PROCEDURE — 83036 HEMOGLOBIN GLYCOSYLATED A1C: CPT

## 2023-10-23 PROCEDURE — 86376 MICROSOMAL ANTIBODY EACH: CPT

## 2023-10-23 PROCEDURE — 86364 TISS TRNSGLTMNASE EA IG CLAS: CPT

## 2023-10-23 PROCEDURE — 84445 ASSAY OF TSI GLOBULIN: CPT

## 2023-10-23 PROCEDURE — 77067 SCR MAMMO BI INCL CAD: CPT | Mod: BILATERAL PROCEDURE | Performed by: RADIOLOGY

## 2023-10-23 PROCEDURE — 77067 SCR MAMMO BI INCL CAD: CPT | Mod: 50

## 2023-10-23 PROCEDURE — 84481 FREE ASSAY (FT-3): CPT

## 2023-10-23 PROCEDURE — 77063 BREAST TOMOSYNTHESIS BI: CPT | Mod: BILATERAL PROCEDURE | Performed by: RADIOLOGY

## 2023-10-23 PROCEDURE — 36415 COLL VENOUS BLD VENIPUNCTURE: CPT

## 2023-10-23 PROCEDURE — 80053 COMPREHEN METABOLIC PANEL: CPT

## 2023-10-24 LAB
GLIADIN PEPTIDE IGA SER IA-ACNC: <1 U/ML
GLIADIN PEPTIDE IGG SER IA-ACNC: <1 U/ML
TTG IGA SER IA-ACNC: <1 U/ML
TTG IGG SER IA-ACNC: <1 U/ML

## 2023-10-26 LAB — TSI SER-ACNC: <1 TSI INDEX

## 2023-11-01 ENCOUNTER — HOSPITAL ENCOUNTER (OUTPATIENT)
Dept: RADIOLOGY | Facility: HOSPITAL | Age: 58
Discharge: HOME | End: 2023-11-01
Payer: COMMERCIAL

## 2023-11-01 DIAGNOSIS — E04.2 MULTINODULAR THYROID: ICD-10-CM

## 2023-11-01 PROCEDURE — 3430000001 HC RX 343 DIAGNOSTIC RADIOPHARMACEUTICALS: Performed by: SURGERY

## 2023-11-01 PROCEDURE — 78014 THYROID IMAGING W/BLOOD FLOW: CPT

## 2023-11-01 PROCEDURE — A9516 IODINE I-123 SOD IODIDE MIC: HCPCS | Performed by: SURGERY

## 2023-11-01 RX ORDER — SODIUM IODIDE I 123 200 UCI/1
391 CAPSULE, GELATIN COATED ORAL
Status: COMPLETED | OUTPATIENT
Start: 2023-11-01 | End: 2023-11-01

## 2023-11-01 RX ADMIN — SODIUM IODIDE I 123 400 MICROCURIE: 200 CAPSULE, GELATIN COATED ORAL at 11:46

## 2023-11-02 ENCOUNTER — HOSPITAL ENCOUNTER (OUTPATIENT)
Dept: RADIOLOGY | Facility: HOSPITAL | Age: 58
Discharge: HOME | End: 2023-11-02
Payer: COMMERCIAL

## 2023-11-02 PROCEDURE — 78014 THYROID IMAGING W/BLOOD FLOW: CPT | Performed by: RADIOLOGY

## 2023-11-03 PROBLEM — N39.0 ACUTE UTI: Status: RESOLVED | Noted: 2023-01-24 | Resolved: 2023-11-03

## 2023-11-03 PROBLEM — F33.2 SEVERE EPISODE OF RECURRENT MAJOR DEPRESSIVE DISORDER, WITHOUT PSYCHOTIC FEATURES (MULTI): Status: RESOLVED | Noted: 2023-01-24 | Resolved: 2023-11-03

## 2023-11-03 PROBLEM — N95.1 HOT FLASHES, MENOPAUSAL: Status: RESOLVED | Noted: 2023-01-24 | Resolved: 2023-11-03

## 2023-11-03 PROBLEM — R30.0 DYSURIA: Status: RESOLVED | Noted: 2023-01-24 | Resolved: 2023-11-03

## 2023-11-03 PROBLEM — I10 BENIGN ESSENTIAL HTN: Status: ACTIVE | Noted: 2023-01-24

## 2023-11-03 PROBLEM — I10 BENIGN ESSENTIAL HTN: Status: ACTIVE | Noted: 2023-11-03

## 2023-11-03 PROBLEM — R00.0 SINUS TACHYCARDIA: Status: RESOLVED | Noted: 2023-01-24 | Resolved: 2023-11-03

## 2023-11-03 PROBLEM — R91.8 LUNG NODULES: Status: ACTIVE | Noted: 2023-11-03

## 2023-11-03 PROBLEM — R63.5 WEIGHT GAIN: Status: RESOLVED | Noted: 2023-01-24 | Resolved: 2023-11-03

## 2023-11-03 PROBLEM — F17.200 NICOTINE DEPENDENCE, UNSPECIFIED, UNCOMPLICATED: Status: ACTIVE | Noted: 2023-11-03

## 2023-11-03 PROBLEM — E05.20 TOXIC MULTINODULAR GOITER: Status: ACTIVE | Noted: 2023-11-03

## 2023-11-09 ENCOUNTER — ANCILLARY PROCEDURE (OUTPATIENT)
Dept: RADIOLOGY | Facility: CLINIC | Age: 58
End: 2023-11-09
Payer: COMMERCIAL

## 2023-11-09 DIAGNOSIS — F17.210 CIGARETTE NICOTINE DEPENDENCE, UNCOMPLICATED: ICD-10-CM

## 2023-11-09 PROCEDURE — 71271 CT THORAX LUNG CANCER SCR C-: CPT

## 2023-11-09 PROCEDURE — 71271 CT THORAX LUNG CANCER SCR C-: CPT | Performed by: RADIOLOGY

## 2023-11-15 ENCOUNTER — TELEPHONE (OUTPATIENT)
Dept: ORTHOPEDIC SURGERY | Facility: HOSPITAL | Age: 58
End: 2023-11-15
Payer: COMMERCIAL

## 2023-11-15 NOTE — TELEPHONE ENCOUNTER
Patient was notified of thyroid uptake scan results showing toxic thyroid nodule left thyroid lobe.  We discussed options and she agreed with moving forward with left thyroid lobectomy.  We will work to get this scheduled for her.

## 2023-11-17 ENCOUNTER — PREP FOR PROCEDURE (OUTPATIENT)
Dept: ORTHOPEDICS | Facility: HOSPITAL | Age: 58
End: 2023-11-17
Payer: COMMERCIAL

## 2023-11-17 DIAGNOSIS — E05.10 TOXIC SOLITARY THYROID NODULE: Primary | ICD-10-CM

## 2023-11-17 DIAGNOSIS — F98.8 ATTENTION DEFICIT DISORDER (ADD) IN ADULT: ICD-10-CM

## 2023-11-17 NOTE — H&P
History Of Present Illness  Stephanie Jones is a 57 y.o. female presenting with toxic thyroid nodule left lobe.  Patient has had hyperthyroidism with suppressed TSH.  She underwent an iodine uptake scan with an iodine uptake greater than 50% consistent with hyperthyroidism.  She had significant uptake with a hot nodule in the left thyroid lobe and suppression of the right thyroid lobe consistent with a left-sided toxic nodule.     Past Medical History  She has a past medical history of Acute vaginitis (2018), Dyshidrosis (pompholyx) (06/10/2015), Fever, unspecified (2021), Nicotine dependence, unspecified, uncomplicated (01/15/2018), Personal history of diseases of the skin and subcutaneous tissue (2015), Personal history of other medical treatment, Personal history of other medical treatment, Personal history of other mental and behavioral disorders (06/10/2015), Superficial mycosis, unspecified (2017), and Urinary tract infection, site not specified (2019).    Surgical History  She has a past surgical history that includes Appendectomy (2014); Hernia repair (2014); Sinus surgery (2014); Rotator cuff repair (2014);  section, classic (2022); and Tonsillectomy (2017).     Social History  She reports that she has never smoked. She has never been exposed to tobacco smoke. She has never used smokeless tobacco. She reports current alcohol use of about 4.0 standard drinks of alcohol per week. She reports that she does not use drugs.    Family History  Family History   Problem Relation Name Age of Onset    Breast cancer Mother  60    Other (venous thromboembolism) Mother      Prostate cancer Father      Other (venous thromboembolism) Brother      Breast cancer Mother's Sister  60        Allergies  Patient has no known allergies.    Review of Systems     Physical ExamConstitutional - General appearance: In no acute distress, well appearing and well  nourished.   Eyes - No proptosis.   Neck - Palpable nodules left thyroid lobe each around 2 cm these move easily upon swallowing no fixation. Trachea is midline. I cannot palpate nodule on the right.   Pulmonary - Respiratory effort: Normal respiration. Auscultation of lungs: Clear to auscultation.   Cardiovascular - Auscultation of heart: Normal rate and rhythm, no murmurs. Carotid arteries exam: Pulse normal with no bruits. Examination of extremities for edema and/or varicosities: No peripheral edema.   Lymphatic - Palpation of lymph nodes: No lymphadenopathy.   Musculoskeletal - Digits and nails: Normal without clubbing or cyanosis. Muscle strength/tone: Normal.   Skin - Skin: Normal without rashes or lesions.   Neurologic - Cranial Nerve Exam: II - XII intact grossly. Coordination: Normal gait.   Psychiatric - Orientation to person, place, and time: Normal. Mood and affect: Normal.         Last Recorded Vitals  There were no vitals taken for this visit.    Relevant Results      Scheduled medications    Continuous medications    PRN medications    No results found for this or any previous visit (from the past 24 hour(s)).    Assessment/Plan       Left thyroid lobectomy at [facility] on December 22, 2023.             Timothy Antonio MD

## 2023-11-19 DIAGNOSIS — Z01.818 PREOPERATIVE TESTING: ICD-10-CM

## 2023-11-19 DIAGNOSIS — Z00.00 HEALTHCARE MAINTENANCE: ICD-10-CM

## 2023-11-19 DIAGNOSIS — E05.10 TOXIC SOLITARY THYROID NODULE: ICD-10-CM

## 2023-11-20 RX ORDER — DEXTROAMPHETAMINE SACCHARATE, AMPHETAMINE ASPARTATE MONOHYDRATE, DEXTROAMPHETAMINE SULFATE AND AMPHETAMINE SULFATE 5; 5; 5; 5 MG/1; MG/1; MG/1; MG/1
20 CAPSULE, EXTENDED RELEASE ORAL EVERY MORNING
Qty: 30 CAPSULE | Refills: 0 | Status: SHIPPED | OUTPATIENT
Start: 2023-11-20 | End: 2024-01-02 | Stop reason: SDUPTHER

## 2023-11-20 NOTE — TELEPHONE ENCOUNTER
Patient requesting refill of amphetamine/dextroamphetamine.  I personally reviewed the OARRS report for this patient and found no concern for abuse, dependence or diversion. Refill sent today.

## 2023-11-21 ENCOUNTER — TELEPHONE (OUTPATIENT)
Dept: SURGERY | Facility: CLINIC | Age: 58
End: 2023-11-21
Payer: COMMERCIAL

## 2023-11-21 NOTE — TELEPHONE ENCOUNTER
Call to patient. No answer. Left voicemail message confirming OR date 12/22/23. Notified patient she needs to complete labs before the date of surgery. Sent email to address on file with pre-op instructions, reqs and list of locations for lab appointments. Callback requested and number provided.

## 2023-12-14 ENCOUNTER — LAB (OUTPATIENT)
Dept: LAB | Facility: LAB | Age: 58
End: 2023-12-14
Payer: COMMERCIAL

## 2023-12-14 ENCOUNTER — OFFICE VISIT (OUTPATIENT)
Dept: PRIMARY CARE | Facility: CLINIC | Age: 58
End: 2023-12-14
Payer: COMMERCIAL

## 2023-12-14 VITALS
SYSTOLIC BLOOD PRESSURE: 136 MMHG | DIASTOLIC BLOOD PRESSURE: 100 MMHG | RESPIRATION RATE: 16 BRPM | BODY MASS INDEX: 32.42 KG/M2 | HEIGHT: 62 IN | WEIGHT: 176.2 LBS | HEART RATE: 88 BPM

## 2023-12-14 DIAGNOSIS — E05.10 TOXIC SOLITARY THYROID NODULE: ICD-10-CM

## 2023-12-14 DIAGNOSIS — I10 BENIGN ESSENTIAL HTN: Primary | ICD-10-CM

## 2023-12-14 DIAGNOSIS — F90.0 ATTENTION DEFICIT HYPERACTIVITY DISORDER (ADHD), PREDOMINANTLY INATTENTIVE TYPE: ICD-10-CM

## 2023-12-14 DIAGNOSIS — E05.90 HYPERTHYROIDISM: ICD-10-CM

## 2023-12-14 DIAGNOSIS — Z01.818 PREOPERATIVE TESTING: ICD-10-CM

## 2023-12-14 DIAGNOSIS — Z00.00 HEALTHCARE MAINTENANCE: ICD-10-CM

## 2023-12-14 PROCEDURE — 3075F SYST BP GE 130 - 139MM HG: CPT | Performed by: INTERNAL MEDICINE

## 2023-12-14 PROCEDURE — 80053 COMPREHEN METABOLIC PANEL: CPT

## 2023-12-14 PROCEDURE — 1036F TOBACCO NON-USER: CPT | Performed by: INTERNAL MEDICINE

## 2023-12-14 PROCEDURE — 3080F DIAST BP >= 90 MM HG: CPT | Performed by: INTERNAL MEDICINE

## 2023-12-14 PROCEDURE — 99214 OFFICE O/P EST MOD 30 MIN: CPT | Performed by: INTERNAL MEDICINE

## 2023-12-14 PROCEDURE — 36415 COLL VENOUS BLD VENIPUNCTURE: CPT

## 2023-12-14 PROCEDURE — 85730 THROMBOPLASTIN TIME PARTIAL: CPT

## 2023-12-14 PROCEDURE — 85610 PROTHROMBIN TIME: CPT

## 2023-12-14 PROCEDURE — 85027 COMPLETE CBC AUTOMATED: CPT

## 2023-12-14 ASSESSMENT — PATIENT HEALTH QUESTIONNAIRE - PHQ9
SUM OF ALL RESPONSES TO PHQ9 QUESTIONS 1 AND 2: 0
1. LITTLE INTEREST OR PLEASURE IN DOING THINGS: NOT AT ALL
2. FEELING DOWN, DEPRESSED OR HOPELESS: NOT AT ALL

## 2023-12-14 ASSESSMENT — PAIN SCALES - GENERAL: PAINLEVEL: 0-NO PAIN

## 2023-12-14 NOTE — PROGRESS NOTES
"Subjective   Stephanie Jones is a 58 y.o. female who presents for Follow-up.      She is going to have a thyroid lobectomy due to hyperfunctioning nodules next Friday by Dr Antonio.  Is outpatient.   She is not on any thyroid replacement right now, are hoping the non diseased side of her thyroid will take over.     She has been taking her bp at home: have been 130-140 over 80-90   She did not start losartan yet    She does not need a refill of adderall.     She has lost 11 pounds since last visit  She is trying a low salt and lower carbs     Review of Systems    Objective   BP (!) 136/100 (BP Location: Left arm, Patient Position: Sitting, BP Cuff Size: Adult)   Pulse 88   Resp 16   Ht 1.575 m (5' 2\")   Wt 79.9 kg (176 lb 3.2 oz)   BMI 32.23 kg/m²    Physical Exam       GEN: NAD  HEENT: normal  NECK: no adenopathy, no thyroid enlargment  LUNGS: CTAB  CV: reg S1/S2 no murmurs  EXT: no leg edema   Assessment/Plan   Problem List Items Addressed This Visit       Attention-deficit/hyperactivity disorder she did not need refill of adderall.     Benign essential HTN - Primary she says she will begin Losartan 50 mg daily. She should not take this on the morning of her surgery.     Toxic solitary thyroid nodule she is going to have this area resected by Dr Antonio     Other Visit Diagnoses       Hyperthyroidism    for surgery.     See me in February.                "

## 2023-12-14 NOTE — PATIENT INSTRUCTIONS
Begin taking LOSARTAN 50 mg once a day.   Don't take this on the morning of your surgery.   See me again in February.

## 2023-12-15 LAB
ALBUMIN SERPL BCP-MCNC: 4.5 G/DL (ref 3.4–5)
ALP SERPL-CCNC: 124 U/L (ref 33–110)
ALT SERPL W P-5'-P-CCNC: 16 U/L (ref 7–45)
ANION GAP SERPL CALC-SCNC: 14 MMOL/L (ref 10–20)
APTT PPP: 28 SECONDS (ref 27–38)
AST SERPL W P-5'-P-CCNC: 15 U/L (ref 9–39)
BILIRUB SERPL-MCNC: 0.5 MG/DL (ref 0–1.2)
BUN SERPL-MCNC: 12 MG/DL (ref 6–23)
CALCIUM SERPL-MCNC: 10.8 MG/DL (ref 8.6–10.6)
CHLORIDE SERPL-SCNC: 104 MMOL/L (ref 98–107)
CO2 SERPL-SCNC: 28 MMOL/L (ref 21–32)
CREAT SERPL-MCNC: 0.7 MG/DL (ref 0.5–1.05)
ERYTHROCYTE [DISTWIDTH] IN BLOOD BY AUTOMATED COUNT: 12.5 % (ref 11.5–14.5)
GFR SERPL CREATININE-BSD FRML MDRD: >90 ML/MIN/1.73M*2
GLUCOSE SERPL-MCNC: 99 MG/DL (ref 74–99)
HCT VFR BLD AUTO: 43.6 % (ref 36–46)
HGB BLD-MCNC: 14.3 G/DL (ref 12–16)
INR PPP: 1 (ref 0.9–1.1)
MCH RBC QN AUTO: 28.1 PG (ref 26–34)
MCHC RBC AUTO-ENTMCNC: 32.8 G/DL (ref 32–36)
MCV RBC AUTO: 86 FL (ref 80–100)
NRBC BLD-RTO: 0 /100 WBCS (ref 0–0)
PLATELET # BLD AUTO: 342 X10*3/UL (ref 150–450)
POTASSIUM SERPL-SCNC: 4.6 MMOL/L (ref 3.5–5.3)
PROT SERPL-MCNC: 7 G/DL (ref 6.4–8.2)
PROTHROMBIN TIME: 11.3 SECONDS (ref 9.8–12.8)
RBC # BLD AUTO: 5.09 X10*6/UL (ref 4–5.2)
SODIUM SERPL-SCNC: 141 MMOL/L (ref 136–145)
WBC # BLD AUTO: 9.6 X10*3/UL (ref 4.4–11.3)

## 2023-12-21 ENCOUNTER — ANESTHESIA EVENT (OUTPATIENT)
Dept: OPERATING ROOM | Facility: HOSPITAL | Age: 58
End: 2023-12-21
Payer: COMMERCIAL

## 2023-12-22 ENCOUNTER — HOSPITAL ENCOUNTER (OUTPATIENT)
Facility: HOSPITAL | Age: 58
Setting detail: OUTPATIENT SURGERY
Discharge: HOME | End: 2023-12-22
Attending: SURGERY | Admitting: SURGERY
Payer: COMMERCIAL

## 2023-12-22 ENCOUNTER — ANESTHESIA (OUTPATIENT)
Dept: OPERATING ROOM | Facility: HOSPITAL | Age: 58
End: 2023-12-22
Payer: COMMERCIAL

## 2023-12-22 VITALS
RESPIRATION RATE: 16 BRPM | DIASTOLIC BLOOD PRESSURE: 64 MMHG | TEMPERATURE: 98.6 F | SYSTOLIC BLOOD PRESSURE: 114 MMHG | BODY MASS INDEX: 31.52 KG/M2 | WEIGHT: 171.3 LBS | HEIGHT: 62 IN | OXYGEN SATURATION: 94 % | HEART RATE: 64 BPM

## 2023-12-22 DIAGNOSIS — E05.10 TOXIC SOLITARY THYROID NODULE: Primary | ICD-10-CM

## 2023-12-22 PROBLEM — F90.9 ADHD: Status: ACTIVE | Noted: 2023-12-22

## 2023-12-22 PROBLEM — E66.9 OBESITY: Status: ACTIVE | Noted: 2023-12-22

## 2023-12-22 PROCEDURE — 2720000007 HC OR 272 NO HCPCS: Performed by: SURGERY

## 2023-12-22 PROCEDURE — 7100000001 HC RECOVERY ROOM TIME - INITIAL BASE CHARGE: Performed by: SURGERY

## 2023-12-22 PROCEDURE — A60220 PR THYROID LOBECTOMY,UNILAT

## 2023-12-22 PROCEDURE — 7100000010 HC PHASE TWO TIME - EACH INCREMENTAL 1 MINUTE: Performed by: SURGERY

## 2023-12-22 PROCEDURE — 88307 TISSUE EXAM BY PATHOLOGIST: CPT | Mod: TC,SUR | Performed by: SURGERY

## 2023-12-22 PROCEDURE — 2500000004 HC RX 250 GENERAL PHARMACY W/ HCPCS (ALT 636 FOR OP/ED): Performed by: PHYSICAL THERAPIST

## 2023-12-22 PROCEDURE — 88307 TISSUE EXAM BY PATHOLOGIST: CPT | Performed by: PATHOLOGY

## 2023-12-22 PROCEDURE — 2500000005 HC RX 250 GENERAL PHARMACY W/O HCPCS: Performed by: ANESTHESIOLOGY

## 2023-12-22 PROCEDURE — 60220 PARTIAL REMOVAL OF THYROID: CPT | Performed by: SURGERY

## 2023-12-22 PROCEDURE — A60220 PR THYROID LOBECTOMY,UNILAT: Performed by: ANESTHESIOLOGY

## 2023-12-22 PROCEDURE — 2500000005 HC RX 250 GENERAL PHARMACY W/O HCPCS: Performed by: PHYSICAL THERAPIST

## 2023-12-22 PROCEDURE — 3600000009 HC OR TIME - EACH INCREMENTAL 1 MINUTE - PROCEDURE LEVEL FOUR: Performed by: SURGERY

## 2023-12-22 PROCEDURE — 7100000002 HC RECOVERY ROOM TIME - EACH INCREMENTAL 1 MINUTE: Performed by: SURGERY

## 2023-12-22 PROCEDURE — 2500000004 HC RX 250 GENERAL PHARMACY W/ HCPCS (ALT 636 FOR OP/ED): Performed by: SURGERY

## 2023-12-22 PROCEDURE — 7100000009 HC PHASE TWO TIME - INITIAL BASE CHARGE: Performed by: SURGERY

## 2023-12-22 PROCEDURE — A4217 STERILE WATER/SALINE, 500 ML: HCPCS | Performed by: SURGERY

## 2023-12-22 PROCEDURE — 2500000001 HC RX 250 WO HCPCS SELF ADMINISTERED DRUGS (ALT 637 FOR MEDICARE OP): Performed by: PHYSICAL THERAPIST

## 2023-12-22 PROCEDURE — 3700000001 HC GENERAL ANESTHESIA TIME - INITIAL BASE CHARGE: Performed by: SURGERY

## 2023-12-22 PROCEDURE — 3700000002 HC GENERAL ANESTHESIA TIME - EACH INCREMENTAL 1 MINUTE: Performed by: SURGERY

## 2023-12-22 PROCEDURE — 2500000005 HC RX 250 GENERAL PHARMACY W/O HCPCS: Performed by: SURGERY

## 2023-12-22 PROCEDURE — 3600000004 HC OR TIME - INITIAL BASE CHARGE - PROCEDURE LEVEL FOUR: Performed by: SURGERY

## 2023-12-22 RX ORDER — PROPOFOL 10 MG/ML
INJECTION, EMULSION INTRAVENOUS AS NEEDED
Status: DISCONTINUED | OUTPATIENT
Start: 2023-12-22 | End: 2023-12-22

## 2023-12-22 RX ORDER — ROCURONIUM BROMIDE 10 MG/ML
INJECTION, SOLUTION INTRAVENOUS AS NEEDED
Status: DISCONTINUED | OUTPATIENT
Start: 2023-12-22 | End: 2023-12-22

## 2023-12-22 RX ORDER — ONDANSETRON HYDROCHLORIDE 2 MG/ML
INJECTION, SOLUTION INTRAVENOUS AS NEEDED
Status: DISCONTINUED | OUTPATIENT
Start: 2023-12-22 | End: 2023-12-22

## 2023-12-22 RX ORDER — OXYCODONE HYDROCHLORIDE 5 MG/1
5 TABLET ORAL EVERY 4 HOURS PRN
Status: DISCONTINUED | OUTPATIENT
Start: 2023-12-22 | End: 2023-12-22 | Stop reason: HOSPADM

## 2023-12-22 RX ORDER — HYDROMORPHONE HYDROCHLORIDE 1 MG/ML
INJECTION, SOLUTION INTRAMUSCULAR; INTRAVENOUS; SUBCUTANEOUS AS NEEDED
Status: DISCONTINUED | OUTPATIENT
Start: 2023-12-22 | End: 2023-12-22

## 2023-12-22 RX ORDER — LIDOCAINE HYDROCHLORIDE 40 MG/ML
SOLUTION TOPICAL AS NEEDED
Status: DISCONTINUED | OUTPATIENT
Start: 2023-12-22 | End: 2023-12-22

## 2023-12-22 RX ORDER — SODIUM CHLORIDE 0.9 G/100ML
IRRIGANT IRRIGATION AS NEEDED
Status: DISCONTINUED | OUTPATIENT
Start: 2023-12-22 | End: 2023-12-22 | Stop reason: HOSPADM

## 2023-12-22 RX ORDER — TRAMADOL HYDROCHLORIDE 50 MG/1
50 TABLET ORAL EVERY 6 HOURS PRN
Qty: 10 TABLET | Refills: 0 | Status: SHIPPED | OUTPATIENT
Start: 2023-12-22 | End: 2023-12-29

## 2023-12-22 RX ORDER — DEXMEDETOMIDINE HYDROCHLORIDE 4 UG/ML
INJECTION, SOLUTION INTRAVENOUS CONTINUOUS PRN
Status: DISCONTINUED | OUTPATIENT
Start: 2023-12-22 | End: 2023-12-22

## 2023-12-22 RX ORDER — FENTANYL CITRATE 50 UG/ML
INJECTION, SOLUTION INTRAMUSCULAR; INTRAVENOUS AS NEEDED
Status: DISCONTINUED | OUTPATIENT
Start: 2023-12-22 | End: 2023-12-22

## 2023-12-22 RX ORDER — PHENYLEPHRINE HCL IN 0.9% NACL 0.4MG/10ML
SYRINGE (ML) INTRAVENOUS AS NEEDED
Status: DISCONTINUED | OUTPATIENT
Start: 2023-12-22 | End: 2023-12-22

## 2023-12-22 RX ORDER — ONDANSETRON HYDROCHLORIDE 2 MG/ML
4 INJECTION, SOLUTION INTRAVENOUS ONCE AS NEEDED
Status: DISCONTINUED | OUTPATIENT
Start: 2023-12-22 | End: 2023-12-22 | Stop reason: HOSPADM

## 2023-12-22 RX ORDER — HYDROMORPHONE HYDROCHLORIDE 1 MG/ML
0.4 INJECTION, SOLUTION INTRAMUSCULAR; INTRAVENOUS; SUBCUTANEOUS EVERY 5 MIN PRN
Status: DISCONTINUED | OUTPATIENT
Start: 2023-12-22 | End: 2023-12-22 | Stop reason: HOSPADM

## 2023-12-22 RX ORDER — METOPROLOL TARTRATE 1 MG/ML
INJECTION, SOLUTION INTRAVENOUS AS NEEDED
Status: DISCONTINUED | OUTPATIENT
Start: 2023-12-22 | End: 2023-12-22

## 2023-12-22 RX ORDER — WATER 1 ML/ML
IRRIGANT IRRIGATION AS NEEDED
Status: DISCONTINUED | OUTPATIENT
Start: 2023-12-22 | End: 2023-12-22 | Stop reason: HOSPADM

## 2023-12-22 RX ORDER — HYDROMORPHONE HYDROCHLORIDE 1 MG/ML
0.25 INJECTION, SOLUTION INTRAMUSCULAR; INTRAVENOUS; SUBCUTANEOUS EVERY 5 MIN PRN
Status: DISCONTINUED | OUTPATIENT
Start: 2023-12-22 | End: 2023-12-22 | Stop reason: HOSPADM

## 2023-12-22 RX ORDER — METHADONE HYDROCHLORIDE 10 MG/1
TABLET ORAL AS NEEDED
Status: DISCONTINUED | OUTPATIENT
Start: 2023-12-22 | End: 2023-12-22

## 2023-12-22 RX ORDER — LIDOCAINE HYDROCHLORIDE 20 MG/ML
INJECTION, SOLUTION INFILTRATION; PERINEURAL AS NEEDED
Status: DISCONTINUED | OUTPATIENT
Start: 2023-12-22 | End: 2023-12-22

## 2023-12-22 RX ORDER — GLYCOPYRROLATE 0.2 MG/ML
INJECTION INTRAMUSCULAR; INTRAVENOUS AS NEEDED
Status: DISCONTINUED | OUTPATIENT
Start: 2023-12-22 | End: 2023-12-22

## 2023-12-22 RX ORDER — BUPIVACAINE HYDROCHLORIDE 5 MG/ML
INJECTION, SOLUTION PERINEURAL AS NEEDED
Status: DISCONTINUED | OUTPATIENT
Start: 2023-12-22 | End: 2023-12-22 | Stop reason: HOSPADM

## 2023-12-22 RX ORDER — SODIUM CHLORIDE, SODIUM LACTATE, POTASSIUM CHLORIDE, CALCIUM CHLORIDE 600; 310; 30; 20 MG/100ML; MG/100ML; MG/100ML; MG/100ML
INJECTION, SOLUTION INTRAVENOUS CONTINUOUS PRN
Status: DISCONTINUED | OUTPATIENT
Start: 2023-12-22 | End: 2023-12-22

## 2023-12-22 RX ORDER — PANTOPRAZOLE SODIUM 40 MG/1
40 TABLET, DELAYED RELEASE ORAL
COMMUNITY
End: 2024-02-22 | Stop reason: SDUPTHER

## 2023-12-22 RX ORDER — SODIUM CHLORIDE, SODIUM LACTATE, POTASSIUM CHLORIDE, CALCIUM CHLORIDE 600; 310; 30; 20 MG/100ML; MG/100ML; MG/100ML; MG/100ML
100 INJECTION, SOLUTION INTRAVENOUS CONTINUOUS
Status: DISCONTINUED | OUTPATIENT
Start: 2023-12-22 | End: 2023-12-22 | Stop reason: HOSPADM

## 2023-12-22 RX ORDER — DEXAMETHASONE SODIUM PHOSPHATE 4 MG/ML
INJECTION, SOLUTION INTRA-ARTICULAR; INTRALESIONAL; INTRAMUSCULAR; INTRAVENOUS; SOFT TISSUE AS NEEDED
Status: DISCONTINUED | OUTPATIENT
Start: 2023-12-22 | End: 2023-12-22

## 2023-12-22 RX ORDER — MIDAZOLAM HYDROCHLORIDE 1 MG/ML
INJECTION INTRAMUSCULAR; INTRAVENOUS AS NEEDED
Status: DISCONTINUED | OUTPATIENT
Start: 2023-12-22 | End: 2023-12-22

## 2023-12-22 RX ADMIN — Medication 160 MCG: at 08:44

## 2023-12-22 RX ADMIN — Medication 4 L/MIN: at 09:45

## 2023-12-22 RX ADMIN — METOPROLOL TARTRATE 2 MG: 1 INJECTION, SOLUTION INTRAVENOUS at 07:37

## 2023-12-22 RX ADMIN — ROCURONIUM BROMIDE 50 MG: 10 INJECTION INTRAVENOUS at 07:31

## 2023-12-22 RX ADMIN — MIDAZOLAM HYDROCHLORIDE 1 MG: 1 INJECTION, SOLUTION INTRAMUSCULAR; INTRAVENOUS at 07:26

## 2023-12-22 RX ADMIN — DEXMEDETOMIDINE HYDROCHLORIDE 0.3 MCG/KG/HR: 4 INJECTION, SOLUTION INTRAVENOUS at 07:59

## 2023-12-22 RX ADMIN — FENTANYL CITRATE 25 MCG: 50 INJECTION, SOLUTION INTRAMUSCULAR; INTRAVENOUS at 07:28

## 2023-12-22 RX ADMIN — ROCURONIUM BROMIDE 10 MG: 10 INJECTION INTRAVENOUS at 08:45

## 2023-12-22 RX ADMIN — ONDANSETRON 4 MG: 2 INJECTION INTRAMUSCULAR; INTRAVENOUS at 09:06

## 2023-12-22 RX ADMIN — FENTANYL CITRATE 50 MCG: 50 INJECTION, SOLUTION INTRAMUSCULAR; INTRAVENOUS at 07:33

## 2023-12-22 RX ADMIN — MIDAZOLAM HYDROCHLORIDE 1 MG: 1 INJECTION, SOLUTION INTRAMUSCULAR; INTRAVENOUS at 07:22

## 2023-12-22 RX ADMIN — Medication 120 MCG: at 07:46

## 2023-12-22 RX ADMIN — DEXAMETHASONE SODIUM PHOSPHATE 10 MG: 4 INJECTION, SOLUTION INTRA-ARTICULAR; INTRALESIONAL; INTRAMUSCULAR; INTRAVENOUS; SOFT TISSUE at 07:42

## 2023-12-22 RX ADMIN — Medication 120 MCG: at 08:01

## 2023-12-22 RX ADMIN — Medication 160 MCG: at 09:36

## 2023-12-22 RX ADMIN — Medication 160 MCG: at 08:33

## 2023-12-22 RX ADMIN — Medication 120 MCG: at 09:17

## 2023-12-22 RX ADMIN — Medication 80 MCG: at 07:40

## 2023-12-22 RX ADMIN — SUGAMMADEX 200 MG: 100 INJECTION, SOLUTION INTRAVENOUS at 09:12

## 2023-12-22 RX ADMIN — SODIUM CHLORIDE, POTASSIUM CHLORIDE, SODIUM LACTATE AND CALCIUM CHLORIDE: 600; 310; 30; 20 INJECTION, SOLUTION INTRAVENOUS at 07:22

## 2023-12-22 RX ADMIN — LIDOCAINE HYDROCHLORIDE 60 MG: 20 INJECTION, SOLUTION INFILTRATION; PERINEURAL at 07:28

## 2023-12-22 RX ADMIN — GLYCOPYRROLATE 0.2 MG: 0.2 INJECTION, SOLUTION INTRAMUSCULAR; INTRAVENOUS at 08:37

## 2023-12-22 RX ADMIN — PROPOFOL 50 MG: 10 INJECTION, EMULSION INTRAVENOUS at 07:33

## 2023-12-22 RX ADMIN — HYDROMORPHONE HYDROCHLORIDE 0.4 MG: 1 INJECTION, SOLUTION INTRAMUSCULAR; INTRAVENOUS; SUBCUTANEOUS at 09:09

## 2023-12-22 RX ADMIN — PROPOFOL 150 MG: 10 INJECTION, EMULSION INTRAVENOUS at 07:29

## 2023-12-22 RX ADMIN — LIDOCAINE HYDROCHLORIDE 4 ML: 40 SOLUTION TOPICAL at 07:35

## 2023-12-22 RX ADMIN — FENTANYL CITRATE 25 MCG: 50 INJECTION, SOLUTION INTRAMUSCULAR; INTRAVENOUS at 07:37

## 2023-12-22 SDOH — HEALTH STABILITY: MENTAL HEALTH: CURRENT SMOKER: 0

## 2023-12-22 ASSESSMENT — PAIN - FUNCTIONAL ASSESSMENT
PAIN_FUNCTIONAL_ASSESSMENT: 0-10

## 2023-12-22 ASSESSMENT — COLUMBIA-SUICIDE SEVERITY RATING SCALE - C-SSRS
6. HAVE YOU EVER DONE ANYTHING, STARTED TO DO ANYTHING, OR PREPARED TO DO ANYTHING TO END YOUR LIFE?: NO
1. IN THE PAST MONTH, HAVE YOU WISHED YOU WERE DEAD OR WISHED YOU COULD GO TO SLEEP AND NOT WAKE UP?: NO
2. HAVE YOU ACTUALLY HAD ANY THOUGHTS OF KILLING YOURSELF?: NO

## 2023-12-22 ASSESSMENT — PAIN SCALES - GENERAL
PAINLEVEL_OUTOF10: 0 - NO PAIN

## 2023-12-22 NOTE — H&P
History Of Present Illness  Stephanie Jones is a 58 y.o. female presenting with toxic thyroid nodule left lobe.  Patient has had hyperthyroidism with suppressed TSH.  She underwent an iodine uptake scan with an iodine uptake greater than 50% consistent with hyperthyroidism.  She had significant uptake with a hot nodule in the left thyroid lobe and suppression of the right thyroid lobe consistent with a left-sided toxic nodule.     Past Medical History  She has a past medical history of Acute vaginitis (2018), Dyshidrosis (pompholyx) (06/10/2015), Fever, unspecified (2021), Nicotine dependence, unspecified, uncomplicated (01/15/2018), Personal history of diseases of the skin and subcutaneous tissue (2015), Personal history of other medical treatment, Personal history of other medical treatment, Personal history of other mental and behavioral disorders (06/10/2015), Superficial mycosis, unspecified (2017), and Urinary tract infection, site not specified (2019).    Surgical History  She has a past surgical history that includes Appendectomy (2014); Hernia repair (2014); Sinus surgery (2014); Rotator cuff repair (2014);  section, classic (2022); and Tonsillectomy (2017).     Social History  She reports that she has never smoked. She has never been exposed to tobacco smoke. She has never used smokeless tobacco. She reports current alcohol use of about 4.0 standard drinks of alcohol per week. She reports that she does not use drugs.    Family History  Family History   Problem Relation Name Age of Onset    Breast cancer Mother  60    Other (venous thromboembolism) Mother      Prostate cancer Father      Other (venous thromboembolism) Brother      Breast cancer Mother's Sister  60        Allergies  Patient has no known allergies.    Review of Systems     Physical ExamConstitutional - General appearance: In no acute distress, well appearing and well  "nourished.   Eyes - No proptosis.   Neck - Palpable nodules left thyroid lobe each around 2 cm these move easily upon swallowing no fixation. Trachea is midline. I cannot palpate nodule on the right.   Pulmonary - Respiratory effort: Normal respiration. Auscultation of lungs: Clear to auscultation.   Cardiovascular - Auscultation of heart: Normal rate and rhythm, no murmurs. Carotid arteries exam: Pulse normal with no bruits. Examination of extremities for edema and/or varicosities: No peripheral edema.   Lymphatic - Palpation of lymph nodes: No lymphadenopathy.   Musculoskeletal - Digits and nails: Normal without clubbing or cyanosis. Muscle strength/tone: Normal.   Skin - Skin: Normal without rashes or lesions.   Neurologic - Cranial Nerve Exam: II - XII intact grossly. Coordination: Normal gait.   Psychiatric - Orientation to person, place, and time: Normal. Mood and affect: Normal.         Last Recorded Vitals  Blood pressure 141/86, pulse 79, temperature 36.5 °C (97.7 °F), temperature source Temporal, resp. rate 16, height 1.575 m (5' 2\"), weight 77.7 kg (171 lb 4.8 oz), SpO2 98 %.    Relevant Results      Scheduled medications    Continuous medications    PRN medications    No results found for this or any previous visit (from the past 24 hour(s)).    Assessment/Plan       Left thyroid lobectomy at [facility] on December 22, 2023.             Favian Rowley MD    "

## 2023-12-22 NOTE — PROGRESS NOTES
Pharmacy Medication History Review    Stephanie Jones is a 58 y.o. female admitted for Toxic solitary thyroid nodule. Pharmacy reviewed the patient's hajwg-ed-jtrylmvdi medications and allergies for accuracy.    The list below reflects the updated PTA list. Comments regarding how patient may be taking medications differently can be found in the Admit Orders Activity  Prior to Admission Medications   Prescriptions Last Dose Informant Patient Reported?   amphetamine-dextroamphetamine XR (Adderall XR) 20 mg 24 hr capsule 12/21/2023 Self No   Sig: Take 1 capsule (20 mg) by mouth once daily in the morning. Do not crush or chew.   losartan (Cozaar) 50 mg tablet 12/20/2023 Self No   Sig: Take 1 tablet (50 mg) by mouth once daily.   pantoprazole (ProtoNix) 40 mg EC tablet Past Month Self Yes   Sig: Take 1 tablet (40 mg) by mouth once daily in the morning. Take before meals. Do not crush, chew, or split.   triamcinolone (Kenalog) 0.025 % cream Not Taking Self No   Sig: Apply 1 Application topically 3 times a day.   Patient not taking: Reported on 10/19/2023      Facility-Administered Medications: None        The list below reflects the updated allergy list. Please review each documented allergy for additional clarification and justification.  Allergies  Reviewed by Juhi Martini [facility] on 12/22/2023   No Known Allergies         Patient declines M2B at discharge. Pharmacy has been updated to [facility] [place], [place], [place].    Sources used to confirm home medication list include: Patient interview, dispensing history, OARRS, PCP visit 12/14/23.    Below are additional concerns with the patient's PTA list.    Juhi Martini [facility]   Transitions of Care Pharmacist  [facility]  Please reach out via Secure Chat for questions, or if no response call [email or web address] or [facility]

## 2023-12-22 NOTE — ANESTHESIA POSTPROCEDURE EVALUATION
Patient: Stephanie Jones    Procedure Summary       Date: 12/22/23 Room / Location: [facility] OR 12 / [facility] OR    Anesthesia Start: 0715 Anesthesia Stop: 0959    Procedure: Lobectomy Thyroid (Left: Neck) Diagnosis:       Toxic solitary thyroid nodule      (Toxic solitary thyroid nodule [E05.10])    Surgeons: Timothy Antonio MD Responsible Provider: Enrike Miles DO    Anesthesia Type: general ASA Status: 2            Anesthesia Type: general    Vitals Value Taken Time   /66 12/22/23 1300   Temp 36.5 °C (97.7 °F) 12/22/23 1200   Pulse 89 12/22/23 1303   Resp 20 12/22/23 1303   SpO2 94 % 12/22/23 1302   Vitals shown include unvalidated device data.    Anesthesia Post Evaluation    Patient location during evaluation: PACU  Patient participation: complete - patient participated  Level of consciousness: awake and alert  Pain management: satisfactory to patient  Airway patency: patent  Cardiovascular status: acceptable and blood pressure returned to baseline  Respiratory status: acceptable  Hydration status: acceptable  Postoperative Nausea and Vomiting: none        There were no known notable events for this encounter.

## 2023-12-22 NOTE — ANESTHESIA PROCEDURE NOTES
Airway  Date/Time: 12/22/2023 7:35 AM  Urgency: elective      Staffing  Performed: JOVON   Authorized by: Enrike Miles DO    Performed by: GEORGE Jarquin-SOO  Patient location during procedure: OR    Indications and Patient Condition  Indications for airway management: anesthesia and airway protection  Spontaneous Ventilation: absent  Sedation level: deep  Preoxygenated: yes  Patient position: sniffing  Mask difficulty assessment: 1 - vent by mask  Planned trial extubation    Final Airway Details  Final airway type: endotracheal airway      Successful airway: ETT  Cuffed: yes   Successful intubation technique: direct laryngoscopy  Facilitating devices/methods: intubating stylet  Endotracheal tube insertion site: oral  Blade: Soledad  Blade size: #4  ETT size (mm): 7.0  Cormack-Lehane Classification: grade I - full view of glottis  Placement verified by: chest auscultation and capnometry   Measured from: teeth  ETT to teeth (cm): 20  Number of attempts at approach: 1  Ventilation between attempts: none  Number of other approaches attempted: 0

## 2023-12-22 NOTE — ANESTHESIA PREPROCEDURE EVALUATION
Patient: Stephanie Jones    Procedure Information       Date/Time: 23 0715    Procedure: Lobectomy Thyroid (Left)    Location: [facility] OR  [facility] OR    Surgeons: Timothy Antonio MD            Relevant Problems   Anesthesia (within normal limits)      Cardiovascular   (+) Benign essential HTN   (+) Pain of left clavicle      Endocrine   (+) Obesity   (+) Toxic multinodular goiter   (+) Toxic solitary thyroid nodule      GI   (+) GERD (gastroesophageal reflux disease)      /Renal (within normal limits)      Neuro/Psych   (+) ADHD      Pulmonary   (+) Lung nodules   (-) Recent URI      GI/Hepatic (within normal limits)      Eyes, Ears, Nose, and Throat (within normal limits)      Other   (+) Lateral epicondylitis of right elbow     Vitals:    23 0620   BP: 141/86   Pulse: 79   Resp: 16   Temp: 36.5 °C (97.7 °F)   SpO2: 98%       Past Surgical History:   Procedure Laterality Date    APPENDECTOMY  2014    Appendectomy     SECTION, CLASSIC  2022     Section    HERNIA REPAIR  2014    Hernia Repair    ROTATOR CUFF REPAIR  2014    Rotator Cuff Repair    SINUS SURGERY  2014    Sinus Surgery    TONSILLECTOMY  2017    Tonsillectomy     Past Medical History:   Diagnosis Date    Acute vaginitis 2018    Bacterial vaginosis    Dyshidrosis (pompholyx) 06/10/2015    Dyshidrotic eczema    Fever, unspecified 2021    Fever and chills    Nicotine dependence, unspecified, uncomplicated 01/15/2018    Tobacco dependence    Personal history of diseases of the skin and subcutaneous tissue 2015    History of acne    Personal history of other medical treatment     History of EKG    Personal history of other medical treatment     H/O mammogram    Personal history of other mental and behavioral disorders 06/10/2015    History of depression    Superficial mycosis, unspecified 2017    Fungal infection of skin    Urinary tract infection, site  not specified 08/29/2019    Acute UTI     No current facility-administered medications for this encounter.  Prior to Admission medications    Medication Sig Start Date End Date Taking? Authorizing Provider   losartan (Cozaar) 50 mg tablet Take 1 tablet (50 mg) by mouth once daily. 10/19/23  Yes Macey Almaguer, DO   amphetamine-dextroamphetamine XR (Adderall XR) 20 mg 24 hr capsule Take 1 capsule (20 mg) by mouth once daily in the morning. Do not crush or chew. 11/20/23 12/26/23  Macey Almaguer DO   pantoprazole (ProtoNix) 40 mg EC tablet Take 1 tablet (40 mg) by mouth once daily in the morning. Take before meals. Do not crush, chew, or split.    Historical Provider, MD   triamcinolone (Kenalog) 0.025 % cream Apply 1 Application topically 3 times a day.  Patient not taking: Reported on 10/19/2023 7/6/23   Macey Almaguer DO   famotidine (Pepcid) 20 mg tablet Take 1 tablet (20 mg) by mouth 2 times a day. 9/15/21 12/22/23  Historical Provider, MD   multivitamin tablet Take by mouth.  12/22/23  Historical Provider, MD   omeprazole OTC (PriLOSEC OTC) 20 mg EC tablet Take 1 tablet (20 mg) by mouth once daily in the morning. Take before meals. Do not crush, chew, or split.  12/22/23  Historical Provider, MD     No Known Allergies  Social History     Tobacco Use    Smoking status: Never     Passive exposure: Never    Smokeless tobacco: Never   Substance Use Topics    Alcohol use: Yes     Alcohol/week: 4.0 standard drinks of alcohol     Types: 4 Cans of beer per week         Chemistry    Lab Results   Component Value Date/Time     12/14/2023 1530    K 4.6 12/14/2023 1530     12/14/2023 1530    CO2 28 12/14/2023 1530    BUN 12 12/14/2023 1530    CREATININE 0.70 12/14/2023 1530    Lab Results   Component Value Date/Time    CALCIUM 10.8 (H) 12/14/2023 1530    ALKPHOS 124 (H) 12/14/2023 1530    AST 15 12/14/2023 1530    ALT 16 12/14/2023 1530    BILITOT 0.5 12/14/2023 1530          Lab Results   Component Value  Date/Time    WBC 9.6 12/14/2023 1530    HGB 14.3 12/14/2023 1530    HCT 43.6 12/14/2023 1530     12/14/2023 1530     Lab Results   Component Value Date/Time    PROTIME 11.3 12/14/2023 1530    INR 1.0 12/14/2023 1530     Encounter Date: 10/19/23   ECG 12 lead (Clinic Performed)    Narrative    Normal sinus. Normal EKG         Clinical information reviewed:   Tobacco  Allergies  Meds   Med Hx  Surg Hx   Fam Hx  Soc Hx        NPO Detail:  NPO/Void Status  Date of Last Liquid: 12/21/23  Time of Last Liquid: 2330  Date of Last Solid: 12/21/23  Time of Last Solid: 2330         Physical Exam    Airway  Mallampati: I  TM distance: >3 FB  Neck ROM: full     Cardiovascular   Rhythm: regular     Dental        Pulmonary   Breath sounds clear to auscultation     Abdominal            Anesthesia Plan    ASA 2     general   (GETA, standard monitors, PIV)  The patient is not a current smoker.    intravenous induction   Postoperative administration of opioids is intended.  Trial extubation is planned.  Anesthetic plan and risks discussed with patient.  Use of blood products discussed with patient who consented to blood products.    Plan discussed with CRNA.

## 2023-12-22 NOTE — OP NOTE
Lobectomy Thyroid (L) Operative Note     Date: 2023  OR Location: [facility] OR    Name: Stephanie Jones, : 1965, Age: 58 y.o., MRN: [id number], Sex: female    Diagnosis  Pre-op Diagnosis     * Toxic solitary thyroid nodule [E05.10] Post-op Diagnosis     * Toxic solitary thyroid nodule [E05.10]     Procedures  Lobectomy Thyroid  74489 - ND TOTAL THYROID LOBECTOMY UNI W/WO ISTHMUSECTOMY      Surgeons      * Timothy Antonio - Primary    Resident/Fellow/Other Assistant:  Surgeon(s) and Role:     * Favian Rowley MD - Resident - Assisting    Procedure Summary  Anesthesia: General  ASA: II  Anesthesia Staff: Anesthesiologist: Enrike Miles DO  CRNA: QUINN Bernardo  C-AA: JOVANNA Pollack  SRNA: QUINN Jarquin  Estimated Blood Loss: 5mL  Intra-op Medications:   Medication Name Total Dose   sodium chloride 0.9 % irrigation solution 1,000 mL   BUPivacaine HCl (Marcaine) 0.5 % (5 mg/mL) injection 10 mL   sterile water irrigation solution 1,000 mL   oxygen (O2) therapy 80 L              Anesthesia Record               Intraprocedure I/O Totals          Intake    Dexmedetomidine 9.00 mL    The total shown is the total volume documented since Anesthesia Start was filed.    lactated Ringer's 600.00 mL    Total Intake 609 mL       Output    Est. Blood Loss 5 mL    Total Output 5 mL       Net    Net Volume 604 mL          Specimen:   ID Type Source Tests Collected by Time   1 : LEFT THYROID LOBE; DOUBLE-TAILED STITCH ON ISTHMUS, SINGLE-TAILED STITCH ON LEFT SUPERIOR POLE Tissue THYROID LOBECTOMY LEFT SURGICAL PATHOLOGY EXAM Timothy Antonio MD 2023 0852        Staff:   Circulator: Amadeo Parker RN; Abida Wagner RN  Scrub Person: Jose Antonio Aguirre         Drains and/or Catheters: * None in log *    Tourniquet Times:         Implants:     Findings: Left thyroid nodule well encapsulated.    Indications: Stephanie Jones is an 58 y.o. female who is having surgery for Toxic  solitary thyroid nodule [E05.10].  She was found to have a completely suppressed TSH at 0.01.  Ultrasound revealed 2.9 cm nodule left thyroid lobe.  Her TSI was negative for Graves' disease antithyroid peroxidase antibody level negative for Hashimoto's.  Thyroid uptake scan showed a toxic nodule left thyroid lobe with complete suppression of the right lobe as the source of her hyperthyroidism.  Therefore we discussed undergoing left thyroid lobectomy today.    The patient was seen in the preoperative area. The risks, benefits, complications, treatment options, non-operative alternatives, expected recovery and outcomes were discussed with the patient. The possibilities of reaction to medication, pulmonary aspiration, injury to surrounding structures, bleeding, recurrent infection, the need for additional procedures, failure to diagnose a condition, and creating a complication requiring transfusion or operation were discussed with the patient. The patient concurred with the proposed plan, giving informed consent.  The site of surgery was properly noted/marked if necessary per policy. The patient has been actively warmed in preoperative area. Preoperative antibiotics are not indicated. Venous thrombosis prophylaxis have been ordered including bilateral sequential compression devices    Procedure Details: The operative date patient's brought to the operating room.  After induction of anesthetic she is prepped and draped in the usual sterile fashion with a towel behind the shoulders and the neck gently extended.  She had SCDs on for DVT prophylaxis.    We made a 5 cm cervical collar incision 2 fingerbreadths above the sternal notch in a natural skin crease.  We divided down through platysma with Bovie electrocautery and raised subplatysmal flaps superiorly to the notch and the thyroid cartilage inferior to the sternal notch and laterally over the sternocleidomastoid muscles.  We then  the strap muscles in the  midline and retracted the left sternohyoid left sternothyroid muscles out laterally to level the carotid artery.    There was a dominant nodule occupying almost the entire left thyroid lobe.  It was soft and completely contained.  No extension to surrounding soft tissues or overlying strap musculature.  The middle thyroid vein was identified ligated and divided with 2-0 silk ties.  Next identified the cricothyroid space.  We went medial to lateral around the superior pole vessels with 2-0 and 3-0 silk ties proximally and the LigaSure device toward the specimen side.  We then went down and began taking branches of the inferior thyroid vein.  At about the 5 o'clock position was a normal-appearing left inferior parathyroid gland peeled away intact and normal vascular pedicle.  We then rolled the thyroid gland and medially.  The anesthesia team and placed an esophageal temperature probe which was palpable.  We dissected out into the tracheoesophageal groove identify the left recurrent laryngeal nerve.  There were branches of the inferior thyroid artery crossing anterior to the nerve these were divided with 3-0 silk ties and clips.  The LigaSure was not used in this area.  As we followed the nerve up at the 2 o'clock position was a normal-appearing left superior parathyroid gland peeled away intact and well-vascularized pedicle.    We continued following the recurrent laryngeal nerve.  It branched about 6 mm proximal to the thyroid musculature.  Both branches were identified and preserved.  We followed that this all the way up into the cricothyroid musculature and then rolled the thyroid gland up onto the trachea.  Nodules all well-contained to the posterior surface as well.  We divided behind the isthmus over the junction of the right thyroid lobe.  There was no abnormal central neck lymph nodes noted.  No pyramidal lobe noted.  We then divided the isthmus of junction of the right thyroid lobe with the LigaSure device  which gave good hemostasis and we removed the specimen.    Specimen was marked as left thyroid lobe with a double tailed suture on the isthmus single tailed suture on the left superior pole and sent off to pathology.  We then irrigated the neck out well and achieved hemostasis.  Recurrent nerve and branches were intact.  Parathyroids looked well-vascularized.  Overall hemostasis was good.  We used Isra powder for final hemostasis and then closed traps and platysma with 3-0 Vicryl skin with 4-0 Monocryl half percent Marcaine was used for postop pain control..  Dry sterile dressings were applied.  Patient tolerated procedure well with no complications.  Complications:  None; patient tolerated the procedure well.    Disposition: PACU - hemodynamically stable.  Condition: stable         Additional Details:     Attending Attestation: I was present and scrubbed for the entire procedure.    Timothy Antonio  Phone Number: [phone]

## 2023-12-22 NOTE — BRIEF OP NOTE
Date: 2023  OR Location: [place] OR    Name: Stephanie Jones, : 1965, Age: 58 y.o., MRN: [id number], Sex: female    Diagnosis  Pre-op Diagnosis     * Toxic solitary thyroid nodule [E05.10] Post-op Diagnosis     * Toxic solitary thyroid nodule [E05.10]     Procedures  Lobectomy Thyroid  67890 - CO TOTAL THYROID LOBECTOMY UNI W/WO ISTHMUSECTOMY    Left thyroid lobectomy  Surgeons      * Timothy Antonio - Primary    Resident/Fellow/Other Assistant:  Surgeon(s) and Role:     * Favian Rowley MD - Resident - Assisting    Procedure Summary  Anesthesia: General  ASA: II  Anesthesia Staff: Anesthesiologist: Enrike Miles DO  CRNA: QUINN Bernardo  C-AA: JOVANNA Pollack  SRNA: QUINN Jarquin  Estimated Blood Loss: 5mL  Intra-op Medications:   Medication Name Total Dose   sodium chloride 0.9 % irrigation solution 1,000 mL   BUPivacaine HCl (Marcaine) 0.5 % (5 mg/mL) injection 10 mL   sterile water irrigation solution 1,000 mL   oxygen (O2) therapy 80 L              Anesthesia Record               Intraprocedure I/O Totals          Intake    Dexmedetomidine 9.00 mL    The total shown is the total volume documented since Anesthesia Start was filed.    lactated Ringer's 600.00 mL    Total Intake 609 mL       Output    Est. Blood Loss 5 mL    Total Output 5 mL       Net    Net Volume 604 mL          Specimen:   ID Type Source Tests Collected by Time   1 : LEFT THYROID LOBE; DOUBLE-TAILED STITCH ON ISTHMUS, SINGLE-TAILED STITCH ON LEFT SUPERIOR POLE Tissue THYROID LOBECTOMY LEFT SURGICAL PATHOLOGY EXAM Timothy Antonio MD 2023 0852        Staff:   Circulator: Amadeo Parker RN; Abida Wagner RN  Scrub Person: Jose Antonio Aguirre          Findings: Nodule in the left thyroid, soft but significant size    Complications:  None; patient tolerated the procedure well.     Disposition: PACU - hemodynamically stable.  Condition: stable  Specimens Collected:   ID Type Source Tests  Collected by Time   1 : LEFT THYROID LOBE; DOUBLE-TAILED STITCH ON ISTHMUS, SINGLE-TAILED STITCH ON LEFT SUPERIOR POLE Tissue THYROID LOBECTOMY LEFT SURGICAL PATHOLOGY EXAM Timothy Antonio MD 12/22/2023 0852     Attending Attestation: I was present and scrubbed for the entire procedure.    Timothy Antonio  Phone Number: [phone]

## 2024-01-02 DIAGNOSIS — F98.8 ATTENTION DEFICIT DISORDER (ADD) IN ADULT: ICD-10-CM

## 2024-01-02 NOTE — TELEPHONE ENCOUNTER
Please refill.    amphetamine-dextroamphetamine XR (Adderall XR) 20 mg 24 hr capsule   Take 1 capsule (20 mg) by mouth once daily in the morning. Do not crush or chew   Drug Kyle  RAJANI

## 2024-01-03 RX ORDER — DEXTROAMPHETAMINE SACCHARATE, AMPHETAMINE ASPARTATE MONOHYDRATE, DEXTROAMPHETAMINE SULFATE AND AMPHETAMINE SULFATE 5; 5; 5; 5 MG/1; MG/1; MG/1; MG/1
20 CAPSULE, EXTENDED RELEASE ORAL EVERY MORNING
Qty: 30 CAPSULE | Refills: 0 | Status: SHIPPED | OUTPATIENT
Start: 2024-01-03 | End: 2024-02-05 | Stop reason: SDUPTHER

## 2024-01-03 NOTE — TELEPHONE ENCOUNTER
Last fill was 11/26/23 for amphetamine/dextroamphetamine. I personally reviewed the OARRS report for this patient and found no concern for abuse, dependence or diversion. Refill sent today.

## 2024-01-04 ENCOUNTER — OFFICE VISIT (OUTPATIENT)
Dept: SURGERY | Facility: CLINIC | Age: 59
End: 2024-01-04
Payer: COMMERCIAL

## 2024-01-04 ENCOUNTER — APPOINTMENT (OUTPATIENT)
Dept: SURGERY | Facility: CLINIC | Age: 59
End: 2024-01-04
Payer: COMMERCIAL

## 2024-01-04 VITALS
BODY MASS INDEX: 31.09 KG/M2 | DIASTOLIC BLOOD PRESSURE: 80 MMHG | WEIGHT: 170 LBS | SYSTOLIC BLOOD PRESSURE: 128 MMHG | HEART RATE: 118 BPM

## 2024-01-04 DIAGNOSIS — E89.0 STATUS POST PARTIAL THYROIDECTOMY: ICD-10-CM

## 2024-01-04 LAB
LABORATORY COMMENT REPORT: NORMAL
PATH REPORT.FINAL DX SPEC: NORMAL
PATH REPORT.GROSS SPEC: NORMAL
PATH REPORT.RELEVANT HX SPEC: NORMAL
PATH REPORT.TOTAL CANCER: NORMAL

## 2024-01-04 PROCEDURE — 99024 POSTOP FOLLOW-UP VISIT: CPT | Performed by: SURGERY

## 2024-01-04 PROCEDURE — 3079F DIAST BP 80-89 MM HG: CPT | Performed by: SURGERY

## 2024-01-04 PROCEDURE — 3074F SYST BP LT 130 MM HG: CPT | Performed by: SURGERY

## 2024-01-04 PROCEDURE — 1036F TOBACCO NON-USER: CPT | Performed by: SURGERY

## 2024-01-04 NOTE — PROGRESS NOTES
Subjective   Patient ID: Stephanie Jones is a 58 y.o. female who presents for postoperative visit after a left thyroid lobectomy December 22, 2023.    HPI Ms. Jones returns for her postop visit today.  She underwent a left thyroid lobectomy for a toxic thyroid nodule.  Final pathology is still pending.  She did well with the surgery and was discharged home as an outpatient.  As she only underwent a thyroid lobectomy no thyroid hormone replacement was initiated.    At this point, she feels good.  No signs or symptoms of hypothyroidism.  No neck pain no difficulty breathing or swallowing no troubles with her voice.    Review of Systems    Objective   Physical Exam  Vitals reviewed.   Constitutional:       Appearance: Normal appearance.      Comments: Her voice is normal   Neck:      Comments: Neck incision healing well.  No seroma.  Trachea midline.  Neurological:      Mental Status: She is alert.         Assessment/Plan     Mrs. Jones is now just about 2 weeks status post thyroid lobectomy for toxic thyroid nodule.  Final pathology is still pending.  I told her I will keep her posted on the results of this.    She has had no signs or symptoms of hypothyroidism.  I gave her laboratory slips to check a TSH and free T4 in about another 2 to 3 weeks.  That would determine if you have any need for thyroid hormone replacement.    If her pathology comes back benign, she should not need to see me in the office and we could handle lab follow-up over the phone.       Timothy Antonio MD 01/04/24 12:27 PM

## 2024-02-05 DIAGNOSIS — F98.8 ATTENTION DEFICIT DISORDER (ADD) IN ADULT: ICD-10-CM

## 2024-02-05 RX ORDER — DEXTROAMPHETAMINE SACCHARATE, AMPHETAMINE ASPARTATE MONOHYDRATE, DEXTROAMPHETAMINE SULFATE AND AMPHETAMINE SULFATE 5; 5; 5; 5 MG/1; MG/1; MG/1; MG/1
20 CAPSULE, EXTENDED RELEASE ORAL EVERY MORNING
Qty: 30 CAPSULE | Refills: 0 | Status: SHIPPED | OUTPATIENT
Start: 2024-02-05 | End: 2024-02-22 | Stop reason: SDUPTHER

## 2024-02-05 NOTE — TELEPHONE ENCOUNTER
She needs refill on adderall. I personally reviewed the OARRS report for this patient and found no concern for abuse, dependence or diversion. Refill sent per pt request.

## 2024-02-13 ENCOUNTER — LAB (OUTPATIENT)
Dept: LAB | Facility: LAB | Age: 59
End: 2024-02-13
Payer: COMMERCIAL

## 2024-02-13 DIAGNOSIS — E89.0 STATUS POST PARTIAL THYROIDECTOMY: ICD-10-CM

## 2024-02-13 PROCEDURE — 36415 COLL VENOUS BLD VENIPUNCTURE: CPT

## 2024-02-13 PROCEDURE — 84439 ASSAY OF FREE THYROXINE: CPT

## 2024-02-13 PROCEDURE — 84443 ASSAY THYROID STIM HORMONE: CPT

## 2024-02-14 LAB
T4 FREE SERPL-MCNC: 0.86 NG/DL (ref 0.78–1.48)
TSH SERPL-ACNC: 2.73 MIU/L (ref 0.44–3.98)

## 2024-02-22 ENCOUNTER — OFFICE VISIT (OUTPATIENT)
Dept: PRIMARY CARE | Facility: CLINIC | Age: 59
End: 2024-02-22
Payer: COMMERCIAL

## 2024-02-22 VITALS
WEIGHT: 179.8 LBS | RESPIRATION RATE: 16 BRPM | BODY MASS INDEX: 33.09 KG/M2 | HEART RATE: 87 BPM | SYSTOLIC BLOOD PRESSURE: 130 MMHG | HEIGHT: 62 IN | DIASTOLIC BLOOD PRESSURE: 90 MMHG

## 2024-02-22 DIAGNOSIS — E05.10 TOXIC SOLITARY THYROID NODULE: ICD-10-CM

## 2024-02-22 DIAGNOSIS — F98.8 ATTENTION DEFICIT DISORDER (ADD) IN ADULT: ICD-10-CM

## 2024-02-22 DIAGNOSIS — K21.9 GASTROESOPHAGEAL REFLUX DISEASE, UNSPECIFIED WHETHER ESOPHAGITIS PRESENT: Primary | ICD-10-CM

## 2024-02-22 DIAGNOSIS — I10 BENIGN ESSENTIAL HTN: ICD-10-CM

## 2024-02-22 PROCEDURE — 3075F SYST BP GE 130 - 139MM HG: CPT | Performed by: INTERNAL MEDICINE

## 2024-02-22 PROCEDURE — 1036F TOBACCO NON-USER: CPT | Performed by: INTERNAL MEDICINE

## 2024-02-22 PROCEDURE — 3080F DIAST BP >= 90 MM HG: CPT | Performed by: INTERNAL MEDICINE

## 2024-02-22 PROCEDURE — 99214 OFFICE O/P EST MOD 30 MIN: CPT | Performed by: INTERNAL MEDICINE

## 2024-02-22 RX ORDER — DEXTROAMPHETAMINE SACCHARATE, AMPHETAMINE ASPARTATE MONOHYDRATE, DEXTROAMPHETAMINE SULFATE AND AMPHETAMINE SULFATE 5; 5; 5; 5 MG/1; MG/1; MG/1; MG/1
20 CAPSULE, EXTENDED RELEASE ORAL EVERY MORNING
Qty: 30 CAPSULE | Refills: 0 | Status: SHIPPED | OUTPATIENT
Start: 2024-02-22 | End: 2024-04-09 | Stop reason: SDUPTHER

## 2024-02-22 RX ORDER — PANTOPRAZOLE SODIUM 40 MG/1
40 TABLET, DELAYED RELEASE ORAL
Qty: 30 TABLET | Refills: 5 | Status: SHIPPED | OUTPATIENT
Start: 2024-02-22

## 2024-02-22 RX ORDER — LOSARTAN POTASSIUM 100 MG/1
100 TABLET ORAL DAILY
Qty: 30 TABLET | Refills: 5 | Status: SHIPPED | OUTPATIENT
Start: 2024-02-22

## 2024-02-22 ASSESSMENT — PAIN SCALES - GENERAL: PAINLEVEL: 0-NO PAIN

## 2024-02-22 NOTE — PATIENT INSTRUCTIONS
Increasing losartan to 100 mg daily.  Take  two losartan at one time daily until you run out and then begin taking one 100 mg daily.  I sent in refill of all other meds.    See me again in 3 to 4 months.  Right before that visit get blood test after fasting.

## 2024-02-22 NOTE — PROGRESS NOTES
Subjective   Stephanie Jones is a 58 y.o. female who presents for Follow-up.    She is recovering well after her surgery: left thryoid lobectomy  Her surgery went well  Her TSH is 2.73 now , was suppressed   Her T4 is good     She is taking losartan at bedtime  She denies any side effects from losartan    She is still taking adderall     OARRS:  Macey Almaguer, DO on 2/22/2024  4:04 PM  I have personally reviewed the OARRS report for Stephanie Jones. I have considered the risks of abuse, dependence, addiction and diversion and I believe that it is clinically appropriate for Stephanie Jones to be prescribed this medication    Is the patient prescribed a combination of a benzodiazepine and opioid?  No    Last Urine Drug Screen / ordered today: Yes  Recent Results (from the past 8760 hour(s))   Drug Screen, Urine With Reflex to Confirmation    Collection Time: 07/06/23  4:12 PM   Result Value Ref Range    DRUG SCREEN COMMENT URINE SEE BELOW     Amphetamine Screen, Urine PRESUMPTIVE NEGATIVE NEGATIVE    Barbiturate Screen, Urine PRESUMPTIVE NEGATIVE NEGATIVE    BENZODIAZEPINE (PRESENCE) IN URINE BY SCREEN METHOD PRESUMPTIVE NEGATIVE NEGATIVE    Cannabinoid Screen, Urine PRESUMPTIVE NEGATIVE NEGATIVE    Cocaine Screen, Urine PRESUMPTIVE NEGATIVE NEGATIVE    Fentanyl, Ur PRESUMPTIVE NEGATIVE NEGATIVE    Methadone Screen, Urine PRESUMPTIVE NEGATIVE NEGATIVE    Opiate Screen, Urine PRESUMPTIVE NEGATIVE NEGATIVE    Oxycodone Screen, Ur PRESUMPTIVE NEGATIVE NEGATIVE    PCP Screen, Urine PRESUMPTIVE NEGATIVE NEGATIVE   Amphetamine Confirm, Urine    Collection Time: 07/06/23  4:12 PM   Result Value Ref Range    Methamphetamine Quant, Ur <200 ng/mL    MDA, Urine <200 ng/mL    MDEA, Urine <200 ng/mL    Phentermine,Urine <200 ng/mL    Amphetamines,Urine <50 ng/mL    MDMA, Urine <200 ng/mL     Results are as expected.         Controlled Substance Agreement:  Date of the Last Agreement: 9/21/2023  Reviewed Controlled Substance  "Agreement including but not limited to the benefits, risks, and alternatives to treatment with a Controlled Substance medication(s).    Stimulants:   What is the patient's goal of therapy? Treatment of ADHD inattentive type  Is this being achieved with current treatment? yes    Activities of Daily Living:   Is your overall impression that this patient is benefiting (symptom reduction outweighs side effects) from stimulant therapy? Yes     1. Physical Functioning: Same  2. Family Relationship: Same  3. Social Relationship: Same  4. Mood: Same  5. Sleep Patterns: Same  6. Overall Function: Same        Review of Systems    Objective   /90 (BP Location: Left arm, Patient Position: Sitting, BP Cuff Size: Large adult)   Pulse 87   Resp 16   Ht 1.575 m (5' 2\")   Wt 81.6 kg (179 lb 12.8 oz)   BMI 32.89 kg/m²    Physical Exam  Visit Vitals  /90 (BP Location: Left arm, Patient Position: Sitting, BP Cuff Size: Large adult)   Pulse 87   Resp 16   Ht 1.575 m (5' 2\")   Wt 81.6 kg (179 lb 12.8 oz)   BMI 32.89 kg/m²   Smoking Status Never   BSA 1.89 m²      GEN: NAD  HEENT: normal  NECK: no adenopathy, healing incision from thyroid nodule removal   LUNGS: CTAB  CV: reg S1/S2 no murmurs  EXT: no leg edema   Assessment/Plan   Problem List Items Addressed This Visit       GERD (gastroesophageal reflux disease) - Primary    Relevant Medications    pantoprazole (ProtoNix) 40 mg EC tablet    Benign essential HTN    increase losartan to 100 mg sILY.     Relevant Medications    losartan (Cozaar) 100 mg tablet    Other Relevant Orders    Comprehensive Metabolic Panel    Lipid Panel    Toxic solitary thyroid nodule hopefully she will not need thyroid medication    Relevant Orders    TSH    T4, free     Other Visit Diagnoses       Attention deficit disorder (ADD) in adult    refill sent today    Relevant Medications    amphetamine-dextroamphetamine XR (Adderall XR) 20 mg 24 hr capsule               "

## 2024-04-09 DIAGNOSIS — F98.8 ATTENTION DEFICIT DISORDER (ADD) IN ADULT: ICD-10-CM

## 2024-04-09 NOTE — TELEPHONE ENCOUNTER
Med refill    amphetamine-dextroamphetamine XR (Adderall XR) 20 mg 24 hr capsule     Take 1 capsule (20 mg) by mouth once daily in the morning. Do not crush or chew.     [facility] - [address]    30 days

## 2024-04-10 DIAGNOSIS — F98.8 ATTENTION DEFICIT DISORDER (ADD) IN ADULT: ICD-10-CM

## 2024-04-10 NOTE — TELEPHONE ENCOUNTER
Pt needs a refill on Adderall- she is completely out.   Drug [facility] on [address] In [place]. [phone]

## 2024-04-11 RX ORDER — DEXTROAMPHETAMINE SACCHARATE, AMPHETAMINE ASPARTATE MONOHYDRATE, DEXTROAMPHETAMINE SULFATE AND AMPHETAMINE SULFATE 5; 5; 5; 5 MG/1; MG/1; MG/1; MG/1
20 CAPSULE, EXTENDED RELEASE ORAL EVERY MORNING
Qty: 30 CAPSULE | Refills: 0 | Status: SHIPPED | OUTPATIENT
Start: 2024-04-11 | End: 2024-05-08 | Stop reason: SDUPTHER

## 2024-04-11 NOTE — TELEPHONE ENCOUNTER
Pt request refill for adderall XR. I personally reviewed the OARRS report for this patient and found no concern for abuse, dependence or diversion. Refill sent per pt request.

## 2024-04-12 RX ORDER — DEXTROAMPHETAMINE SACCHARATE, AMPHETAMINE ASPARTATE MONOHYDRATE, DEXTROAMPHETAMINE SULFATE AND AMPHETAMINE SULFATE 5; 5; 5; 5 MG/1; MG/1; MG/1; MG/1
20 CAPSULE, EXTENDED RELEASE ORAL EVERY MORNING
Qty: 30 CAPSULE | Refills: 0 | OUTPATIENT
Start: 2024-04-12 | End: 2024-05-12

## 2024-05-08 DIAGNOSIS — Z51.81 MEDICATION MONITORING ENCOUNTER: Primary | ICD-10-CM

## 2024-05-08 DIAGNOSIS — F98.8 ATTENTION DEFICIT DISORDER (ADD) IN ADULT: ICD-10-CM

## 2024-05-08 RX ORDER — DEXTROAMPHETAMINE SACCHARATE, AMPHETAMINE ASPARTATE MONOHYDRATE, DEXTROAMPHETAMINE SULFATE AND AMPHETAMINE SULFATE 5; 5; 5; 5 MG/1; MG/1; MG/1; MG/1
20 CAPSULE, EXTENDED RELEASE ORAL EVERY MORNING
Qty: 30 CAPSULE | Refills: 0 | Status: SHIPPED | OUTPATIENT
Start: 2024-05-08 | End: 2024-06-07

## 2024-05-08 NOTE — TELEPHONE ENCOUNTER
Med refill      amphetamine-dextroamphetamine XR (Adderall XR) 20 mg 24 hr capsule    Take 1 capsule (20 mg) by mouth once daily in the morning. Do not crush or chew.     [facility] - [address]

## 2024-06-20 ENCOUNTER — APPOINTMENT (OUTPATIENT)
Dept: PRIMARY CARE | Facility: CLINIC | Age: 59
End: 2024-06-20
Payer: COMMERCIAL

## 2024-06-20 ENCOUNTER — LAB (OUTPATIENT)
Dept: LAB | Facility: LAB | Age: 59
End: 2024-06-20
Payer: COMMERCIAL

## 2024-06-20 VITALS
RESPIRATION RATE: 16 BRPM | SYSTOLIC BLOOD PRESSURE: 155 MMHG | WEIGHT: 183 LBS | DIASTOLIC BLOOD PRESSURE: 97 MMHG | BODY MASS INDEX: 33.68 KG/M2 | HEART RATE: 99 BPM | HEIGHT: 62 IN

## 2024-06-20 DIAGNOSIS — Z79.899 MEDICATION MANAGEMENT: Primary | ICD-10-CM

## 2024-06-20 DIAGNOSIS — Z79.899 MEDICATION MANAGEMENT: ICD-10-CM

## 2024-06-20 DIAGNOSIS — I10 BENIGN ESSENTIAL HTN: ICD-10-CM

## 2024-06-20 DIAGNOSIS — E05.10 TOXIC SOLITARY THYROID NODULE: ICD-10-CM

## 2024-06-20 DIAGNOSIS — R10.33 PERIUMBILICAL ABDOMINAL PAIN: ICD-10-CM

## 2024-06-20 DIAGNOSIS — F98.8 ATTENTION DEFICIT DISORDER (ADD) IN ADULT: ICD-10-CM

## 2024-06-20 DIAGNOSIS — F90.0 ATTENTION DEFICIT HYPERACTIVITY DISORDER (ADHD), PREDOMINANTLY INATTENTIVE TYPE: ICD-10-CM

## 2024-06-20 LAB
AMPHETAMINES UR QL SCN: ABNORMAL
BARBITURATES UR QL SCN: ABNORMAL
BZE UR QL SCN: ABNORMAL
CANNABINOIDS UR QL SCN: ABNORMAL
CREAT UR-MCNC: 33 MG/DL (ref 20–320)
PCP UR QL SCN: ABNORMAL

## 2024-06-20 PROCEDURE — 80307 DRUG TEST PRSMV CHEM ANLYZR: CPT

## 2024-06-20 PROCEDURE — 3080F DIAST BP >= 90 MM HG: CPT | Performed by: INTERNAL MEDICINE

## 2024-06-20 PROCEDURE — 36415 COLL VENOUS BLD VENIPUNCTURE: CPT

## 2024-06-20 PROCEDURE — 80365 DRUG SCREENING OXYCODONE: CPT

## 2024-06-20 PROCEDURE — 82570 ASSAY OF URINE CREATININE: CPT

## 2024-06-20 PROCEDURE — 80368 SEDATIVE HYPNOTICS: CPT

## 2024-06-20 PROCEDURE — 80053 COMPREHEN METABOLIC PANEL: CPT

## 2024-06-20 PROCEDURE — 80061 LIPID PANEL: CPT

## 2024-06-20 PROCEDURE — 80346 BENZODIAZEPINES1-12: CPT

## 2024-06-20 PROCEDURE — 80354 DRUG SCREENING FENTANYL: CPT

## 2024-06-20 PROCEDURE — 80324 DRUG SCREEN AMPHETAMINES 1/2: CPT

## 2024-06-20 PROCEDURE — 80358 DRUG SCREENING METHADONE: CPT

## 2024-06-20 PROCEDURE — 84443 ASSAY THYROID STIM HORMONE: CPT

## 2024-06-20 PROCEDURE — 80361 OPIATES 1 OR MORE: CPT

## 2024-06-20 PROCEDURE — 80373 DRUG SCREENING TRAMADOL: CPT

## 2024-06-20 PROCEDURE — 3077F SYST BP >= 140 MM HG: CPT | Performed by: INTERNAL MEDICINE

## 2024-06-20 PROCEDURE — 99214 OFFICE O/P EST MOD 30 MIN: CPT | Performed by: INTERNAL MEDICINE

## 2024-06-20 PROCEDURE — 84439 ASSAY OF FREE THYROXINE: CPT

## 2024-06-20 RX ORDER — DEXTROAMPHETAMINE SACCHARATE, AMPHETAMINE ASPARTATE MONOHYDRATE, DEXTROAMPHETAMINE SULFATE AND AMPHETAMINE SULFATE 5; 5; 5; 5 MG/1; MG/1; MG/1; MG/1
20 CAPSULE, EXTENDED RELEASE ORAL EVERY MORNING
Qty: 30 CAPSULE | Refills: 0 | Status: SHIPPED | OUTPATIENT
Start: 2024-06-20 | End: 2024-07-20

## 2024-06-20 ASSESSMENT — PAIN SCALES - GENERAL: PAINLEVEL: 0-NO PAIN

## 2024-06-20 NOTE — PATIENT INSTRUCTIONS
Schedule with Dr Farrar.    Remain on same medicines.   Band diet, avoid large quantities of liquid at one time.   I recommend trying a probiotic like Align or Culturelle.     If the pain does not seem to go away I recommend ER .     See me again in 2 months .

## 2024-06-20 NOTE — PROGRESS NOTES
"Subjective   Stephanie Jones is a 58 y.o. female who presents for Follow-up.    At the end of may she had 3 days of watery diarrhea, and it resolved until last week due to diarrhea for 2 days.  She was unable to leave the house when this happens    She is having lots of abdominal pain today  She was just sitting in her desk and it started  She says it feels like a \"stabbing in her uterus and a pulling behind her belly button\"  She says that today it just started when she was sitting ; this is the longest time she ever had this so far  She has been having diarrhea or very soft stool  Is usually just once a day   She is not nauseated  She has bloating after meals that can last for an hour and she's really distended    She has a ventral hernia , has had three hernia surgeries and had 3 C sectonis   Was 24 years ago     She is taking pantoprazole from Dr Jaime.     OARRS:  Macey Almaguer DO on 6/20/2024  3:45 PM  I have personally reviewed the OARRS report for Stephanie Jones. I have considered the risks of abuse, dependence, addiction and diversion and I believe that it is clinically appropriate for Stephanie Jones to be prescribed this medication    Is the patient prescribed a combination of a benzodiazepine and opioid?  No    Last Urine Drug Screen / ordered today: Yes  Recent Results (from the past 8760 hour(s))   Drug Screen, Urine With Reflex to Confirmation    Collection Time: 07/06/23  4:12 PM   Result Value Ref Range    DRUG SCREEN COMMENT URINE SEE BELOW     Amphetamine Screen, Urine PRESUMPTIVE NEGATIVE NEGATIVE    Barbiturate Screen, Urine PRESUMPTIVE NEGATIVE NEGATIVE    BENZODIAZEPINE (PRESENCE) IN URINE BY SCREEN METHOD PRESUMPTIVE NEGATIVE NEGATIVE    Cannabinoid Screen, Urine PRESUMPTIVE NEGATIVE NEGATIVE    Cocaine Screen, Urine PRESUMPTIVE NEGATIVE NEGATIVE    Fentanyl, Ur PRESUMPTIVE NEGATIVE NEGATIVE    Methadone Screen, Urine PRESUMPTIVE NEGATIVE NEGATIVE    Opiate Screen, Urine PRESUMPTIVE NEGATIVE " "NEGATIVE    Oxycodone Screen, Ur PRESUMPTIVE NEGATIVE NEGATIVE    PCP Screen, Urine PRESUMPTIVE NEGATIVE NEGATIVE   Amphetamine Confirm, Urine    Collection Time: 07/06/23  4:12 PM   Result Value Ref Range    Methamphetamine Quant, Ur <200 ng/mL    MDA, Urine <200 ng/mL    MDEA, Urine <200 ng/mL    Phentermine,Urine <200 ng/mL    Amphetamines,Urine <50 ng/mL    MDMA, Urine <200 ng/mL     N/A        Controlled Substance Agreement:  Date of the Last Agreement: 09/21/2023  Reviewed Controlled Substance Agreement including but not limited to the benefits, risks, and alternatives to treatment with a Controlled Substance medication(s).    Stimulants:   What is the patient's goal of therapy? Improvement of focus and attention  Is this being achieved with current treatment? yes    Activities of Daily Living:   Is your overall impression that this patient is benefiting (symptom reduction outweighs side effects) from stimulant therapy? Yes     1. Physical Functioning: Same  2. Family Relationship: Same  3. Social Relationship: Same  4. Mood: Better  5. Sleep Patterns: Same  6. Overall Function: Same    Review of Systems    Objective   BP (!) 155/97   Pulse 99   Resp 16   Ht 1.575 m (5' 2\")   Wt 83 kg (183 lb)   BMI 33.47 kg/m²    Physical Exam  Visit Vitals  BP (!) 155/97   Pulse 99   Resp 16   Ht 1.575 m (5' 2\")   Wt 83 kg (183 lb)   BMI 33.47 kg/m²   Smoking Status Never   BSA 1.91 m²      GEN: NAD  HEENT: normal  NECK: no adenopathy, no thyroid enlargment  LUNGS: CTAB  CV: reg S1/S2 no murmurs  ABD: she has a ventral hernia defect above her umbilicus.    EXT: no leg edema   Assessment/Plan   Problem List Items Addressed This Visit    None  Visit Diagnoses       Periumbilical abdominal pain she has has multiple abdomnal surgeries and also hernia repairs, putting her at risk for bowel obstruction or incarcerated hernia. I referred her to Dr Farrar. I also advised her that if her pain gets worse she should go to ER. "   Benign hypertension remain on losartan 100 mg  ADD adult refilled adderall XR today. Urine drug screen completed today.

## 2024-06-21 LAB
ALBUMIN SERPL BCP-MCNC: 4.5 G/DL (ref 3.4–5)
ALP SERPL-CCNC: 146 U/L (ref 33–110)
ALT SERPL W P-5'-P-CCNC: 16 U/L (ref 7–45)
ANION GAP SERPL CALC-SCNC: 12 MMOL/L (ref 10–20)
AST SERPL W P-5'-P-CCNC: 15 U/L (ref 9–39)
BILIRUB SERPL-MCNC: 0.5 MG/DL (ref 0–1.2)
BUN SERPL-MCNC: 12 MG/DL (ref 6–23)
CALCIUM SERPL-MCNC: 10.4 MG/DL (ref 8.6–10.6)
CHLORIDE SERPL-SCNC: 105 MMOL/L (ref 98–107)
CHOLEST SERPL-MCNC: 232 MG/DL (ref 0–199)
CHOLESTEROL/HDL RATIO: 3.2
CO2 SERPL-SCNC: 27 MMOL/L (ref 21–32)
CREAT SERPL-MCNC: 0.71 MG/DL (ref 0.5–1.05)
EGFRCR SERPLBLD CKD-EPI 2021: >90 ML/MIN/1.73M*2
GLUCOSE SERPL-MCNC: 103 MG/DL (ref 74–99)
HDLC SERPL-MCNC: 73.2 MG/DL
LDLC SERPL CALC-MCNC: 135 MG/DL
NON HDL CHOLESTEROL: 159 MG/DL (ref 0–149)
POTASSIUM SERPL-SCNC: 4.7 MMOL/L (ref 3.5–5.3)
PROT SERPL-MCNC: 7.2 G/DL (ref 6.4–8.2)
SODIUM SERPL-SCNC: 139 MMOL/L (ref 136–145)
T4 FREE SERPL-MCNC: 1.01 NG/DL (ref 0.78–1.48)
TRIGL SERPL-MCNC: 119 MG/DL (ref 0–149)
TSH SERPL-ACNC: 4.2 MIU/L (ref 0.44–3.98)
VLDL: 24 MG/DL (ref 0–40)

## 2024-06-24 LAB
AMPHET UR-MCNC: 1719 NG/ML
MDA UR-MCNC: <200 NG/ML
MDEA UR-MCNC: <200 NG/ML
MDMA UR-MCNC: <200 NG/ML
METHAMPHET UR-MCNC: <200 NG/ML
PHENTERMINE UR CFM-MCNC: <200 NG/ML

## 2024-07-02 PROBLEM — R03.0 ELEVATED BLOOD PRESSURE READING WITHOUT DIAGNOSIS OF HYPERTENSION: Status: ACTIVE | Noted: 2024-07-02

## 2024-07-03 ENCOUNTER — APPOINTMENT (OUTPATIENT)
Dept: SURGERY | Facility: CLINIC | Age: 59
End: 2024-07-03
Payer: COMMERCIAL

## 2024-07-03 NOTE — PROGRESS NOTES
"History Of Present Illness :  Stephanie Jones is a 58 y.o. female who presents on referral from Dr. Macey Almaguer for an abdominal wall evaluation.  The patient was most recently seen by Dr. Almaguer on 2024 that note was reviewed.     The patient has an extensive and complex past abdominal surgical history including but not limited to  section x 3 through a lower midline incision, open appendectomy, and in the least for abdominal wall hernia operations which apparently started with an umbilical hernia.  The 3 of the fourth operation did require mesh placement.  She did have a panniculectomy at either her third or fourth operation.  Her most recent operation was 20-24 years ago.  She has not had any recent abdominal operations.    The patient notes chronic abdominal pain which she describes as a pressure sensation or a dull ache which waxes and wanes.  She notes some fullness along the upper midline abdominal wall.  She denies any GI symptoms such as nausea vomiting diarrhea constipation change in bowel habits or blood in the stool.        The patient underwent CT imaging of the abdomen pelvis with IV contrast on 2023 for the indication of generalized abdominal pain.  This was ordered by Dr. Kirk Jaime.  I personally reviewed the report and the images.  According to the report, abdominal wall findings include: \"Surgical mesh midline. Midline ventral hernia measuring 15 mm in size containing mesenteric fat just deep to the mesh. Small bowel loop juxtaposed to the anterior abdominal wall at the level of the mesh without obstruction or fluid.  Surgical clips left inguinal region.\"  On my review, there is evidence of mesh beneath the mid central abdominal wall.  Superior to this, there is a fascial defect measuring 15 mm transverse by 34 mm sagittal.  There appears to be mesh behind this defect which is unattached.   There is fatty tissue through this fascial defect, with no bowel loops.  There are really " no bowel loops in the facility.  Other incidental findings include diverticulosis of the sigmoid colon, midpole right kidney cyst, and a 7 mm pleural nodule right posterior hemithorax.     Patient has a history of a left thyroid lobectomy on 2023 per Dr. Timothy Antonio at [facility].    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   1.5 cm: Thyroid   No pathological diagnosis: Parathyroids, 2             - left hemithyroidectomy specimen    The patient is .  She lives in [place].  She has 3 grown children.  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      Past Medical History   Medical History        Past Medical History:   Diagnosis Date    Acute vaginitis 2018     Bacterial vaginosis    Dyshidrosis (pompholyx) 06/10/2015     Dyshidrotic eczema    Fever, unspecified 2021     Fever and chills    Nicotine dependence, unspecified, uncomplicated 01/15/2018     Tobacco dependence    Personal history of diseases of the skin and subcutaneous tissue 2015     History of acne    Personal history of other medical treatment       History of EKG    Personal history of other medical treatment       H/O mammogram    Personal history of other mental and behavioral disorders 06/10/2015     History of depression    Superficial mycosis, unspecified 2017     Fungal infection of skin    Urinary tract infection, site not specified 2019     Acute UTI            Surgical History    Surgical History         Past Surgical History:   Procedure Laterality Date    APPENDECTOMY   2014     Appendectomy     SECTION, CLASSIC   2022      Section    HERNIA REPAIR   2014     Hernia Repair x 3 umbillical    ROTATOR CUFF REPAIR   2014     Rotator Cuff Repair    SINUS SURGERY   2014     Sinus Surgery    TONSILLECTOMY   2017     Tonsillectomy            Allergies   No Known Allergies      Home Meds       Current  Outpatient Medications   Medication Instructions    amphetamine-dextroamphetamine XR (Adderall XR) 20 mg 24 hr capsule 20 mg, oral, Every morning, Do not crush or chew.    losartan (COZAAR) 100 mg, oral, Daily    pantoprazole (PROTONIX) 40 mg, oral, Daily before breakfast, Do not crush, chew, or split.    triamcinolone (Kenalog) 0.025 % cream 1 Application, Topical, 3 times daily         Family History    Family History          Family History   Problem Relation Name Age of Onset    Breast cancer Mother   60    Other (venous thromboembolism) Mother        Prostate cancer Father        Other (venous thromboembolism) Brother        Breast cancer Mother's Sister   60            Social History  Social History            Tobacco Use    Smoking status: Never       Passive exposure: Never    Smokeless tobacco: Never   Vaping Use    Vaping status: Every Day    Start date: 6/1/2019    Substances: Nicotine    Devices: Disposable, Pre-filled or refillable cartridge, Pre-filled pod   Substance Use Topics    Alcohol use: Yes       Alcohol/week: 4.0 standard drinks of alcohol       Types: 4 Cans of beer per week    Drug use: Never         Review Of Systems    Review of Systems     General: Not Present- Obesity, Cancer, HIV, MRSA, Recent Cold/Flu, Tired During the Day and VRE.  HEENT: Not Present- Migraine, Cataracts, Glaucoma, Macular Degeneration and Retinal Detachment.  Respiratory: Not Present- Asthma, Chronic Cough, Difficulty Breathing on Exertion, Difficulty Breathing at Rest, Emphysema, Frequent Bronchitis, Home CPAP/BiPAP, Home Oxygen, Pulmonary Embolus, Pneumonia/TB, Sleep Apnea and Snoring.  Cardiovascular: Not Present- Chest Pain, Congestive Heart Failure, Heart Attack, Coronary Artery Disease, Heart Stent, High Cholesterol/Lipids, Internal Defibrillator, Irregular Heart Beat, Mitral Valve Prolapse, Murmur, Pacemaker and Peripheral Vascular Disease.  Gastrointestinal: Not Present- Heartburn, Hepatitis, Hiatal Hernia,  Jaundice, Stomach Ulcer and IBS.  Female Genitourinary: Not Present- Kidney Failure, Kidney Stones, Dialysis and Urinary Tract Infection.  Musculoskeletal: Not Present- Arthritis, Back Pain and Fibromyalgia.  Neurological: Not Present- Headaches, Numbness, Tingling, Seizures, Stroke,  Shunt and Weakness.  Psychiatric: Not Present- Anxiety, Bipolar, Depression and Panic Attacks.  Endocrine: Not Present- Diabetes, Hyperthyroidism, Hypothyroidism and Low Blood Sugar.  Hematology: Not Present- Abnormal Bleeding, Anemia and Blood Clots.     Vitals  There were no vitals taken for this visit.      Physical Exam   Abdominal / Ano-Rectal Physical Exam    Mary, my medical assistant, chaperoned during the examination    General  General Appearance - Not in acute distress.  Well-developed and well-nourished.  Alert and oriented times 3.    Chest and Lung Exam  Auscultation:  Breath sounds: - Normal.  Clear and equal bilaterally.  Adventitious sounds: - No Adventitious sounds.    Cardiovascular  Auscultation: Rate and Rhythm - Regular. Heart Sounds - Normal heart sounds.  No murmurs.    Abdomen  Inspection: Inspection of the abdomen reveals - No Visible peristalsis, No Abnormal pulsations, No Paradoxical  movements and No Hernias.  Palpation/Percussion: Palpation and Percussion of the abdomen reveal - Non Tender, No Rebound tenderness, NoRigidity (guarding) and No Palpable abdominal masses.  Liver: - Normal.  Spleen: - Normal.  Auscultation: Auscultation of the abdomen reveals - Bowel sounds normal and No Abdominal bruits.  Surgical scars: Lower transverse  Patient examined supine and standing, with and without Valsalva  5 cm superior to the umbilicus (luis-umbilicus) there is a 3 x 3 cm nondescript minimally tender soft tissue fullness; no fascial defect can be palpated    Inguinal  No inguinal or femoral hernias or lymphadenopathy bilaterally.    Rectal  Anorectal Exam:  not examined.       Assessment/Plan   Ms. Jones  has a very complex abdominal surgical history as noted.    She has chronic abdominal pain, which is likely secondary to chronic scar tissue along the abdominal wall and otherwise.    There is some mild abdominal wall soft tissue fullness in the mid epigastrium as described.  I cannot palpate a fascial defect.  On CAT scan imaging, there is a corresponding fascial defect with prolapsed fat.  However, there appears to be either intact or unattached mesh posterior to this.  There are no associated bowel loops.    Recommendations:    No operative therapy is indicated recommended.   The patient's multiple previous abdominal operations does increase the overall  risk of surgical complications postoperatively.  The patient may have some entrapped fat between the previously placed mesh and the fascia, or perhaps a small fascial defect with prolapsed fat.  Nonetheless, with no associated bowel loops, this abdominal wall abnormality is low risk, and  can be safely watched and treated expectantly.      No activity restrictions    Follow-up as needed if the fullness in the mid epigastrium changes in size or character, or if she develops worsening abdominal pain

## 2024-07-22 DIAGNOSIS — F98.8 ATTENTION DEFICIT DISORDER (ADD) IN ADULT: ICD-10-CM

## 2024-07-23 RX ORDER — DEXTROAMPHETAMINE SACCHARATE, AMPHETAMINE ASPARTATE MONOHYDRATE, DEXTROAMPHETAMINE SULFATE AND AMPHETAMINE SULFATE 5; 5; 5; 5 MG/1; MG/1; MG/1; MG/1
20 CAPSULE, EXTENDED RELEASE ORAL EVERY MORNING
Qty: 30 CAPSULE | Refills: 0 | Status: SHIPPED | OUTPATIENT
Start: 2024-07-23 | End: 2024-08-22

## 2024-07-23 NOTE — TELEPHONE ENCOUNTER
Pt request refill of adderall. I personally reviewed the OARRS report for this patient and found no concern for abuse, dependence or diversion. Refill sent per pt request.

## 2024-07-30 ENCOUNTER — TELEPHONE (OUTPATIENT)
Dept: PRIMARY CARE | Facility: CLINIC | Age: 59
End: 2024-07-30
Payer: COMMERCIAL

## 2024-07-30 NOTE — TELEPHONE ENCOUNTER
Message left on machine. Patient pharmacy is out of her generic adderall xr. Cannot afford name brand. They do have generic in 15 mg. Patient willing to go with that dose. She has been off her medication for a week and is having a hard time. Can this be sent in asap.   [facility] [address]

## 2024-07-31 DIAGNOSIS — F90.0 ATTENTION DEFICIT HYPERACTIVITY DISORDER (ADHD), PREDOMINANTLY INATTENTIVE TYPE: Primary | ICD-10-CM

## 2024-07-31 RX ORDER — DEXTROAMPHETAMINE SACCHARATE, AMPHETAMINE ASPARTATE MONOHYDRATE, DEXTROAMPHETAMINE SULFATE AND AMPHETAMINE SULFATE 3.75; 3.75; 3.75; 3.75 MG/1; MG/1; MG/1; MG/1
15 CAPSULE, EXTENDED RELEASE ORAL EVERY MORNING
Qty: 30 CAPSULE | Refills: 0 | Status: SHIPPED | OUTPATIENT
Start: 2024-07-31 | End: 2024-08-30

## 2024-07-31 NOTE — PROGRESS NOTES
international    Message  Received: Today  LISBETH Killian DO  Caller: Unspecified (Yesterday,  4:37 PM)  I spoke with [facility] they were going to fill the prescription and then realized they were out of it and cancelled it. They do have the 15 mg in stock if you could send a new prescription over.          Previous Messages    Patient Call  (Newest Message First)  View All Conversations on this Encounter   Mae Mcclelland MA  You11 minutes ago (3:47 PM)       I spoke with [facility] they were going to fill the prescription and then realized they were out of it and cancelled it. They do have the 15 mg in stock if you could send a new prescription over.      You  Mae Mcclelland MA32 minutes ago (3:26 PM)       Please call [facility] [address]. Tell them that she is calling saying that she is out of amphetamine/dextroamphetamine XR 20, but the OARRS report shows that it was filled on 7/27/24.  Tell them that the patient is calling us saying that they don't have the 20 mg dose but they have the 15 mg dose. Tell them  I need to know what is going on with this.      Gissell Shepard routed conversation to You; Mae Mcclelland MA43 minutes ago (3:15 PM)     Gissell Shepard43 minutes ago (3:15 PM)     DM  Patient calling again,has not had medication for over a week.can this be addressed today.         Note        Stephanie Jones [phone]  Gissell Shepard44 minutes ago (3:14 PM)      Mae Mcclelland MA routed conversation to You7 hours ago (8:02 AM)      Gissell Shepard routed conversation to You; Mae Mcclelland MA23 hours ago (4:43 PM)     Gissell Shepard23 hours ago (4:43 PM)     DM  Message left on machine. Patient pharmacy is out of her generic adderall xr. Cannot afford name brand. They do have generic in 15 mg. Patient willing to go with that dose. She has been off her medication for a week and is having a hard time. Can this be sent in asa.   [facility]            Note        Stephanie Jones [phone]   Gissell Shepard23 hours ago (4:37 PM)           Recent Patient Communication     Last Update Description Specialty     Today -- Primary Care     Do [id number] [facility] Macey Almaguer Open     1 week ago Meds approved: 1 Primary Care     Do [id number] [facility] Gissell Huerta

## 2024-08-13 ENCOUNTER — APPOINTMENT (OUTPATIENT)
Dept: SURGERY | Facility: CLINIC | Age: 59
End: 2024-08-13
Payer: COMMERCIAL

## 2024-08-13 VITALS
RESPIRATION RATE: 17 BRPM | DIASTOLIC BLOOD PRESSURE: 96 MMHG | SYSTOLIC BLOOD PRESSURE: 145 MMHG | OXYGEN SATURATION: 98 % | WEIGHT: 184 LBS | HEART RATE: 97 BPM | BODY MASS INDEX: 33.86 KG/M2 | TEMPERATURE: 98.6 F | HEIGHT: 62 IN

## 2024-08-13 DIAGNOSIS — R10.33 PERIUMBILICAL ABDOMINAL PAIN: ICD-10-CM

## 2024-08-13 PROCEDURE — 3008F BODY MASS INDEX DOCD: CPT | Performed by: SURGERY

## 2024-08-13 PROCEDURE — 99204 OFFICE O/P NEW MOD 45 MIN: CPT | Performed by: SURGERY

## 2024-08-13 PROCEDURE — 1036F TOBACCO NON-USER: CPT | Performed by: SURGERY

## 2024-08-13 PROCEDURE — 3077F SYST BP >= 140 MM HG: CPT | Performed by: SURGERY

## 2024-08-13 PROCEDURE — 3080F DIAST BP >= 90 MM HG: CPT | Performed by: SURGERY

## 2024-08-22 ENCOUNTER — APPOINTMENT (OUTPATIENT)
Dept: PRIMARY CARE | Facility: CLINIC | Age: 59
End: 2024-08-22
Payer: COMMERCIAL

## 2024-08-27 DIAGNOSIS — I10 BENIGN ESSENTIAL HTN: ICD-10-CM

## 2024-08-27 RX ORDER — LOSARTAN POTASSIUM 100 MG/1
100 TABLET ORAL DAILY
Qty: 30 TABLET | Refills: 5 | Status: SHIPPED | OUTPATIENT
Start: 2024-08-27

## 2024-08-28 ENCOUNTER — APPOINTMENT (OUTPATIENT)
Dept: PRIMARY CARE | Facility: CLINIC | Age: 59
End: 2024-08-28
Payer: COMMERCIAL

## 2024-08-28 VITALS
HEIGHT: 62 IN | BODY MASS INDEX: 34.3 KG/M2 | HEART RATE: 90 BPM | WEIGHT: 186.4 LBS | SYSTOLIC BLOOD PRESSURE: 137 MMHG | RESPIRATION RATE: 16 BRPM | DIASTOLIC BLOOD PRESSURE: 90 MMHG

## 2024-08-28 DIAGNOSIS — F90.0 ATTENTION DEFICIT HYPERACTIVITY DISORDER (ADHD), PREDOMINANTLY INATTENTIVE TYPE: ICD-10-CM

## 2024-08-28 DIAGNOSIS — R74.8 ELEVATED ALKALINE PHOSPHATASE MEASUREMENT: ICD-10-CM

## 2024-08-28 DIAGNOSIS — R73.09 ELEVATED GLUCOSE: ICD-10-CM

## 2024-08-28 DIAGNOSIS — B35.1 TOENAIL FUNGUS: ICD-10-CM

## 2024-08-28 DIAGNOSIS — R79.89 ABNORMAL SERUM THYROID STIMULATING HORMONE (TSH) LEVEL: ICD-10-CM

## 2024-08-28 DIAGNOSIS — R79.89 ABNORMAL TSH: ICD-10-CM

## 2024-08-28 DIAGNOSIS — R74.8 ELEVATED ALKALINE PHOSPHATASE LEVEL: Primary | ICD-10-CM

## 2024-08-28 PROCEDURE — 3008F BODY MASS INDEX DOCD: CPT | Performed by: INTERNAL MEDICINE

## 2024-08-28 PROCEDURE — 99214 OFFICE O/P EST MOD 30 MIN: CPT | Performed by: INTERNAL MEDICINE

## 2024-08-28 PROCEDURE — 3075F SYST BP GE 130 - 139MM HG: CPT | Performed by: INTERNAL MEDICINE

## 2024-08-28 PROCEDURE — 3080F DIAST BP >= 90 MM HG: CPT | Performed by: INTERNAL MEDICINE

## 2024-08-28 RX ORDER — DEXTROAMPHETAMINE SACCHARATE, AMPHETAMINE ASPARTATE MONOHYDRATE, DEXTROAMPHETAMINE SULFATE AND AMPHETAMINE SULFATE 3.75; 3.75; 3.75; 3.75 MG/1; MG/1; MG/1; MG/1
15 CAPSULE, EXTENDED RELEASE ORAL EVERY MORNING
Qty: 30 CAPSULE | Refills: 0 | Status: SHIPPED | OUTPATIENT
Start: 2024-08-28 | End: 2024-09-27

## 2024-08-28 RX ORDER — TERBINAFINE HYDROCHLORIDE 250 MG/1
250 TABLET ORAL DAILY
Qty: 30 TABLET | Refills: 5 | Status: SHIPPED | OUTPATIENT
Start: 2024-08-28

## 2024-08-28 ASSESSMENT — PAIN SCALES - GENERAL: PAINLEVEL: 0-NO PAIN

## 2024-08-28 NOTE — PROGRESS NOTES
"Subjective   Patient ID: Stephanie Jones is a 58 y.o. female who presents for Follow-up.    HPI     She has been gaining wt: Feb was 179 and is 186  She says \"nothing fits\"    She may need levothyroxine if next TSH is elevated    Her Alk P was elevated : her last visit she had severe abd pain and she went to ER  She had been a few hours of pain, vomiting did not help her at all in ER . Pain meds helped her some .     OARRS:  Macey Almaguer,  on 8/28/2024 11:50 AM  I have personally reviewed the OARRS report for Stephanie Jones. I have considered the risks of abuse, dependence, addiction and diversion and I believe that it is clinically appropriate for Stephanie Jones to be prescribed this medication    Is the patient prescribed a combination of a benzodiazepine and opioid?  No    Last Urine Drug Screen / ordered today: No  Recent Results (from the past 8760 hour(s))   Amphetamine Confirm, Urine    Collection Time: 06/20/24  3:22 PM   Result Value Ref Range    Methamphetamine Quant, Ur <200 ng/mL    MDA, Urine <200 ng/mL    MDEA, Urine <200 ng/mL    Phentermine,Urine <200 ng/mL    Amphetamines,Urine 1719 ng/mL    MDMA, Urine <200 ng/mL   Screen Opiate/Opioid/Benzo Prescription Compliance    Collection Time: 06/20/24  3:22 PM   Result Value Ref Range    Creatinine, Urine Random 33.0 20.0 - 320.0 mg/dL    Amphetamine Screen, Urine Presumptive Positive (A) Presumptive Negative    Barbiturate Screen, Urine Presumptive Negative Presumptive Negative    Cannabinoid Screen, Urine Presumptive Negative Presumptive Negative    Cocaine Metabolite Screen, Urine Presumptive Negative Presumptive Negative    PCP Screen, Urine Presumptive Negative Presumptive Negative   Confirmation Opiate/Opioid/Benzo Prescription Compliance    Collection Time: 06/20/24  3:19 PM   Result Value Ref Range    Clonazepam <25 <25 ng/mL    7-Aminoclonazepam <25 <25 ng/mL    Alprazolam <25 <25 ng/mL    Alpha-Hydroxyalprazolam <25 <25 ng/mL    Midazolam <25 " <25 ng/mL    Alpha-Hydroxymidazolam <25 <25 ng/mL    Chlordiazepoxide <25 <25 ng/mL    Diazepam <25 <25 ng/mL    Nordiazepam <25 <25 ng/mL    Temazepam <25 <25 ng/mL    Oxazepam <25 <25 ng/mL    Lorazepam <25 <25 ng/mL    Methadone <25 <25 ng/mL    EDDP <25 <25 ng/mL    6-Acetylmorphine <25 <25 ng/mL    Codeine <50 <50 ng/mL    Hydrocodone <25 <25 ng/mL    Hydromorphone <25 <25 ng/mL    Morphine  <50 <50 ng/mL    Norhydrocodone <25 <25 ng/mL    Noroxycodone <25 <25 ng/mL    Oxycodone <25 <25 ng/mL    Oxymorphone <25 <25 ng/mL    Fentanyl <2.5 <2.5 ng/mL    Norfentanyl <2.5 <2.5 ng/mL    Tramadol <50 <50 ng/mL    O-Desmethyltramadol <50 <50 ng/mL    Zolpidem <25 <25 ng/mL    Zolpidem Metabolite (ZCA) <25 <25 ng/mL     Results are as expected.         Controlled Substance Agreement:  Date of the Last Agreement: 9/21/2023  Reviewed Controlled Substance Agreement including but not limited to the benefits, risks, and alternatives to treatment with a Controlled Substance medication(s).    Stimulants:   What is the patient's goal of therapy? Tx of Attention defecit disorder  Is this being achieved with current treatment? yes    Activities of Daily Living:   Is your overall impression that this patient is benefiting (symptom reduction outweighs side effects) from stimulant therapy? Yes     1. Physical Functioning: Same  2. Family Relationship: Same  3. Social Relationship: Same  4. Mood: Better  5. Sleep Patterns: Same  6. Overall Function: Better    Review of Systems      Current Outpatient Medications:     amphetamine-dextroamphetamine XR (Adderall XR) 15 mg 24 hr capsule, Take 1 capsule (15 mg) by mouth once daily in the morning. Do not crush or chew., Disp: 30 capsule, Rfl: 0    amphetamine-dextroamphetamine XR (Adderall XR) 20 mg 24 hr capsule, Take 1 capsule (20 mg) by mouth once daily in the morning. Do not crush or chew., Disp: 30 capsule, Rfl: 0    losartan (Cozaar) 100 mg tablet, TAKE 1 TABLET BY MOUTH ONCE DAILY,  "Disp: 30 tablet, Rfl: 5    pantoprazole (ProtoNix) 40 mg EC tablet, Take 1 tablet (40 mg) by mouth once daily in the morning. Take before meals. Do not crush, chew, or split., Disp: 30 tablet, Rfl: 5    triamcinolone (Kenalog) 0.025 % cream, Apply 1 Application topically 3 times a day. (Patient not taking: Reported on 10/19/2023), Disp: 89 g, Rfl: 0    Objective   /90   Pulse 90   Resp 16   Ht 1.575 m (5' 2\")   Wt 84.6 kg (186 lb 6.4 oz)   BMI 34.09 kg/m²     Physical Exam  Constitutional:       Appearance: Normal appearance.   Cardiovascular:      Rate and Rhythm: Normal rate and regular rhythm.      Heart sounds: Normal heart sounds.   Pulmonary:      Effort: Pulmonary effort is normal.      Breath sounds: Normal breath sounds.   Abdominal:      General: Bowel sounds are normal. There is no distension.      Palpations: Abdomen is soft. There is no mass.      Tenderness: There is no abdominal tenderness.   Lymphadenopathy:      Cervical: No cervical adenopathy.   Skin:     General: Skin is warm and dry.   Neurological:      General: No focal deficit present.         Assessment/Plan   Problem List Items Addressed This Visit       Attention-deficit/hyperactivity disorder refilled adderall XR . She is doing ok on 15 mg, and difficult to get the 20 mg dose.  She will need a new CSA at next visit.     Relevant Medications    amphetamine-dextroamphetamine XR (Adderall XR) 15 mg 24 hr capsule     Other Visit Diagnoses       Elevated alkaline phosphatase level    -  Primary    Relevant Orders    Comprehensive Metabolic Panel    CBC and Auto Differential    Alkaline Phosphatase, Isoenzymes    Elevated glucose        Relevant Orders    Hemoglobin A1C    Abnormal TSH    I think she is developing hypothyroidism after her partial thyroidectomy. I want her to get repeat thyroid blood test in October. She is having weight gain and fatigue. Her TSH was 4.2. Will recheck in October. If TSH is still in same range, or more " elevated, will begin levothyroxine    Relevant Orders    TSH    T4, free    Toenail fungus    she really wants to be treated for this.  Begin terbinafine. Will monitor AST/ALT    Relevant Medications    terbinafine (LamISIL) 250 mg tablet    See me in early October.

## 2024-08-28 NOTE — PATIENT INSTRUCTIONS
See me again in early October for 30 min visit.    Get fasting blood test done close to October visit, to recheck thyroid, liver, blood sugar .

## 2024-09-01 DIAGNOSIS — K21.9 GASTROESOPHAGEAL REFLUX DISEASE, UNSPECIFIED WHETHER ESOPHAGITIS PRESENT: ICD-10-CM

## 2024-09-03 RX ORDER — PANTOPRAZOLE SODIUM 40 MG/1
TABLET, DELAYED RELEASE ORAL
Qty: 30 TABLET | Refills: 5 | Status: SHIPPED | OUTPATIENT
Start: 2024-09-03

## 2024-10-01 ENCOUNTER — LAB (OUTPATIENT)
Dept: LAB | Facility: LAB | Age: 59
End: 2024-10-01
Payer: COMMERCIAL

## 2024-10-01 DIAGNOSIS — R74.8 ELEVATED ALKALINE PHOSPHATASE LEVEL: ICD-10-CM

## 2024-10-01 DIAGNOSIS — R73.09 ELEVATED GLUCOSE: ICD-10-CM

## 2024-10-01 DIAGNOSIS — R79.89 ABNORMAL TSH: ICD-10-CM

## 2024-10-01 PROCEDURE — 80053 COMPREHEN METABOLIC PANEL: CPT

## 2024-10-01 PROCEDURE — 84443 ASSAY THYROID STIM HORMONE: CPT

## 2024-10-01 PROCEDURE — 84439 ASSAY OF FREE THYROXINE: CPT

## 2024-10-01 PROCEDURE — 83036 HEMOGLOBIN GLYCOSYLATED A1C: CPT

## 2024-10-01 PROCEDURE — 84080 ASSAY ALKALINE PHOSPHATASES: CPT

## 2024-10-01 PROCEDURE — 85025 COMPLETE CBC W/AUTO DIFF WBC: CPT

## 2024-10-01 PROCEDURE — 36415 COLL VENOUS BLD VENIPUNCTURE: CPT

## 2024-10-02 LAB
ALBUMIN SERPL BCP-MCNC: 4.6 G/DL (ref 3.4–5)
ALP SERPL-CCNC: 138 U/L (ref 33–110)
ALT SERPL W P-5'-P-CCNC: 20 U/L (ref 7–45)
ANION GAP SERPL CALC-SCNC: 17 MMOL/L (ref 10–20)
AST SERPL W P-5'-P-CCNC: 18 U/L (ref 9–39)
BASOPHILS # BLD AUTO: 0.1 X10*3/UL (ref 0–0.1)
BASOPHILS NFR BLD AUTO: 1 %
BILIRUB SERPL-MCNC: 0.5 MG/DL (ref 0–1.2)
BUN SERPL-MCNC: 12 MG/DL (ref 6–23)
CALCIUM SERPL-MCNC: 11.7 MG/DL (ref 8.6–10.6)
CHLORIDE SERPL-SCNC: 101 MMOL/L (ref 98–107)
CO2 SERPL-SCNC: 28 MMOL/L (ref 21–32)
CREAT SERPL-MCNC: 0.75 MG/DL (ref 0.5–1.05)
EGFRCR SERPLBLD CKD-EPI 2021: >90 ML/MIN/1.73M*2
EOSINOPHIL # BLD AUTO: 0.27 X10*3/UL (ref 0–0.7)
EOSINOPHIL NFR BLD AUTO: 2.6 %
ERYTHROCYTE [DISTWIDTH] IN BLOOD BY AUTOMATED COUNT: 13 % (ref 11.5–14.5)
EST. AVERAGE GLUCOSE BLD GHB EST-MCNC: 126 MG/DL
GLUCOSE SERPL-MCNC: 99 MG/DL (ref 74–99)
HBA1C MFR BLD: 6 %
HCT VFR BLD AUTO: 48 % (ref 36–46)
HGB BLD-MCNC: 15.5 G/DL (ref 12–16)
IMM GRANULOCYTES # BLD AUTO: 0.03 X10*3/UL (ref 0–0.7)
IMM GRANULOCYTES NFR BLD AUTO: 0.3 % (ref 0–0.9)
LYMPHOCYTES # BLD AUTO: 4.08 X10*3/UL (ref 1.2–4.8)
LYMPHOCYTES NFR BLD AUTO: 39.4 %
MCH RBC QN AUTO: 29.2 PG (ref 26–34)
MCHC RBC AUTO-ENTMCNC: 32.3 G/DL (ref 32–36)
MCV RBC AUTO: 91 FL (ref 80–100)
MONOCYTES # BLD AUTO: 0.56 X10*3/UL (ref 0.1–1)
MONOCYTES NFR BLD AUTO: 5.4 %
NEUTROPHILS # BLD AUTO: 5.31 X10*3/UL (ref 1.2–7.7)
NEUTROPHILS NFR BLD AUTO: 51.3 %
NRBC BLD-RTO: 0 /100 WBCS (ref 0–0)
PLATELET # BLD AUTO: 404 X10*3/UL (ref 150–450)
POTASSIUM SERPL-SCNC: 4.9 MMOL/L (ref 3.5–5.3)
PROT SERPL-MCNC: 7.6 G/DL (ref 6.4–8.2)
RBC # BLD AUTO: 5.3 X10*6/UL (ref 4–5.2)
SODIUM SERPL-SCNC: 141 MMOL/L (ref 136–145)
T4 FREE SERPL-MCNC: 1.23 NG/DL (ref 0.78–1.48)
TSH SERPL-ACNC: 3.16 MIU/L (ref 0.44–3.98)
WBC # BLD AUTO: 10.4 X10*3/UL (ref 4.4–11.3)

## 2024-10-03 ENCOUNTER — APPOINTMENT (OUTPATIENT)
Dept: PRIMARY CARE | Facility: CLINIC | Age: 59
End: 2024-10-03
Payer: COMMERCIAL

## 2024-10-03 VITALS
HEIGHT: 62 IN | HEART RATE: 97 BPM | RESPIRATION RATE: 16 BRPM | BODY MASS INDEX: 34.37 KG/M2 | DIASTOLIC BLOOD PRESSURE: 91 MMHG | WEIGHT: 186.8 LBS | SYSTOLIC BLOOD PRESSURE: 135 MMHG

## 2024-10-03 DIAGNOSIS — E83.52 HYPERCALCEMIA: ICD-10-CM

## 2024-10-03 DIAGNOSIS — R73.03 PREDIABETES: ICD-10-CM

## 2024-10-03 DIAGNOSIS — E89.0 POSTOPERATIVE HYPOTHYROIDISM: Primary | ICD-10-CM

## 2024-10-03 DIAGNOSIS — Z12.4 SCREENING FOR CERVICAL CANCER: ICD-10-CM

## 2024-10-03 DIAGNOSIS — F90.0 ATTENTION DEFICIT HYPERACTIVITY DISORDER (ADHD), PREDOMINANTLY INATTENTIVE TYPE: ICD-10-CM

## 2024-10-03 DIAGNOSIS — R74.8 ELEVATED SERUM ALKALINE PHOSPHATASE LEVEL: ICD-10-CM

## 2024-10-03 DIAGNOSIS — R74.8 ELEVATED ALKALINE PHOSPHATASE LEVEL: ICD-10-CM

## 2024-10-03 DIAGNOSIS — N95.1 SYMPTOMS, SUCH AS FLUSHING, SLEEPLESSNESS, HEADACHE, LACK OF CONCENTRATION, ASSOCIATED WITH THE MENOPAUSE: ICD-10-CM

## 2024-10-03 PROCEDURE — 3080F DIAST BP >= 90 MM HG: CPT | Performed by: INTERNAL MEDICINE

## 2024-10-03 PROCEDURE — 3075F SYST BP GE 130 - 139MM HG: CPT | Performed by: INTERNAL MEDICINE

## 2024-10-03 PROCEDURE — 3008F BODY MASS INDEX DOCD: CPT | Performed by: INTERNAL MEDICINE

## 2024-10-03 PROCEDURE — 99214 OFFICE O/P EST MOD 30 MIN: CPT | Performed by: INTERNAL MEDICINE

## 2024-10-03 RX ORDER — DEXTROAMPHETAMINE SACCHARATE, AMPHETAMINE ASPARTATE MONOHYDRATE, DEXTROAMPHETAMINE SULFATE AND AMPHETAMINE SULFATE 5; 5; 5; 5 MG/1; MG/1; MG/1; MG/1
20 CAPSULE, EXTENDED RELEASE ORAL EVERY MORNING
Qty: 30 CAPSULE | Refills: 0 | Status: SHIPPED | OUTPATIENT
Start: 2024-12-02 | End: 2024-10-04 | Stop reason: WASHOUT

## 2024-10-03 RX ORDER — LEVOTHYROXINE SODIUM 25 UG/1
25 TABLET ORAL DAILY
Qty: 30 TABLET | Refills: 3 | Status: SHIPPED | OUTPATIENT
Start: 2024-10-03

## 2024-10-03 ASSESSMENT — PATIENT HEALTH QUESTIONNAIRE - PHQ9
2. FEELING DOWN, DEPRESSED OR HOPELESS: NOT AT ALL
1. LITTLE INTEREST OR PLEASURE IN DOING THINGS: NOT AT ALL
SUM OF ALL RESPONSES TO PHQ9 QUESTIONS 1 AND 2: 0

## 2024-10-03 ASSESSMENT — PAIN SCALES - GENERAL: PAINLEVEL: 0-NO PAIN

## 2024-10-03 NOTE — PROGRESS NOTES
Subjective   Patient ID: Stephanie Jones is a 58 y.o. female who presents for Follow-up.    HPI     Her alk phos has been consistently mildly elevated  Her Calcium was 11.7  She said she stays well hydrated     Her A1c was 6.0, up from 5.7 last year     She is taking itraconazole for her toenail fungus.     She wanted 20 mg dose adderall   OARRS:  Macey Almaguer DO on 10/3/2024  4:16 PM  I have personally reviewed the OARRS report for Stephanie Jones. I have considered the risks of abuse, dependence, addiction and diversion and I believe that it is clinically appropriate for Stephanie Jones to be prescribed this medication    Is the patient prescribed a combination of a benzodiazepine and opioid?  No    Last Urine Drug Screen / ordered today: No  Recent Results (from the past 8760 hours)   Amphetamine Confirm, Urine    Collection Time: 06/20/24  3:22 PM   Result Value Ref Range    Methamphetamine Quant, Ur <200 ng/mL    MDA, Urine <200 ng/mL    MDEA, Urine <200 ng/mL    Phentermine,Urine <200 ng/mL    Amphetamines,Urine 1719 ng/mL    MDMA, Urine <200 ng/mL   Screen Opiate/Opioid/Benzo Prescription Compliance    Collection Time: 06/20/24  3:22 PM   Result Value Ref Range    Creatinine, Urine Random 33.0 20.0 - 320.0 mg/dL    Amphetamine Screen, Urine Presumptive Positive (A) Presumptive Negative    Barbiturate Screen, Urine Presumptive Negative Presumptive Negative    Cannabinoid Screen, Urine Presumptive Negative Presumptive Negative    Cocaine Metabolite Screen, Urine Presumptive Negative Presumptive Negative    PCP Screen, Urine Presumptive Negative Presumptive Negative   Confirmation Opiate/Opioid/Benzo Prescription Compliance    Collection Time: 06/20/24  3:19 PM   Result Value Ref Range    Clonazepam <25 <25 ng/mL    7-Aminoclonazepam <25 <25 ng/mL    Alprazolam <25 <25 ng/mL    Alpha-Hydroxyalprazolam <25 <25 ng/mL    Midazolam <25 <25 ng/mL    Alpha-Hydroxymidazolam <25 <25 ng/mL    Chlordiazepoxide <25 <25  ng/mL    Diazepam <25 <25 ng/mL    Nordiazepam <25 <25 ng/mL    Temazepam <25 <25 ng/mL    Oxazepam <25 <25 ng/mL    Lorazepam <25 <25 ng/mL    Methadone <25 <25 ng/mL    EDDP <25 <25 ng/mL    6-Acetylmorphine <25 <25 ng/mL    Codeine <50 <50 ng/mL    Hydrocodone <25 <25 ng/mL    Hydromorphone <25 <25 ng/mL    Morphine  <50 <50 ng/mL    Norhydrocodone <25 <25 ng/mL    Noroxycodone <25 <25 ng/mL    Oxycodone <25 <25 ng/mL    Oxymorphone <25 <25 ng/mL    Fentanyl <2.5 <2.5 ng/mL    Norfentanyl <2.5 <2.5 ng/mL    Tramadol <50 <50 ng/mL    O-Desmethyltramadol <50 <50 ng/mL    Zolpidem <25 <25 ng/mL    Zolpidem Metabolite (ZCA) <25 <25 ng/mL     Results are as expected.         Controlled Substance Agreement:  Date of the Last Agreement: 10/3/2024  Reviewed Controlled Substance Agreement including but not limited to the benefits, risks, and alternatives to treatment with a Controlled Substance medication(s).    Stimulants:   What is the patient's goal of therapy? Treatment of ADD/inattentive  Is this being achieved with current treatment? yes    Activities of Daily Living:   Is your overall impression that this patient is benefiting (symptom reduction outweighs side effects) from stimulant therapy? Yes     1. Physical Functioning: Same  2. Family Relationship: Better  3. Social Relationship: Better  4. Mood: Better  5. Sleep Patterns: Same  6. Overall Function: Better    Review of Systems      Current Outpatient Medications:     amphetamine-dextroamphetamine XR (Adderall XR) 20 mg 24 hr capsule, Take 1 capsule (20 mg) by mouth once daily in the morning. Do not crush or chew. PLEASE FILL TODAY 10/04/2024, Disp: 30 capsule, Rfl: 0    levothyroxine (Synthroid) 25 mcg tablet, Take 1 tablet (25 mcg) by mouth early in the morning.. Take on an empty stomach at the same time each day, either 30 to 60 minutes prior to breakfast, Disp: 30 tablet, Rfl: 3    losartan (Cozaar) 100 mg tablet, TAKE 1 TABLET BY MOUTH ONCE DAILY, Disp:  "30 tablet, Rfl: 5    pantoprazole (ProtoNix) 40 mg EC tablet, TAKE 1 TABLET BY MOUTH ONCE DAILY IN THE MORNING. take before meals.d o not crush, chew, split, Disp: 30 tablet, Rfl: 5    terbinafine (LamISIL) 250 mg tablet, Take 1 tablet (250 mg) by mouth once daily., Disp: 30 tablet, Rfl: 5    triamcinolone (Kenalog) 0.025 % cream, Apply 1 Application topically 3 times a day. (Patient not taking: Reported on 10/19/2023), Disp: 89 g, Rfl: 0    Objective   BP (!) 135/91   Pulse 97   Resp 16   Ht 1.575 m (5' 2\")   Wt 84.7 kg (186 lb 12.8 oz)   BMI 34.17 kg/m²     Physical Exam  Constitutional:       Appearance: Normal appearance.   HENT:      Mouth/Throat:      Mouth: Mucous membranes are moist.      Pharynx: Oropharynx is clear.   Eyes:      Conjunctiva/sclera: Conjunctivae normal.      Pupils: Pupils are equal, round, and reactive to light.   Cardiovascular:      Rate and Rhythm: Normal rate and regular rhythm.      Heart sounds: Normal heart sounds.   Pulmonary:      Effort: Pulmonary effort is normal.      Breath sounds: Normal breath sounds.   Abdominal:      General: Bowel sounds are normal. There is no distension.      Palpations: Abdomen is soft. There is no mass.      Tenderness: There is no abdominal tenderness.   Lymphadenopathy:      Cervical: No cervical adenopathy.   Skin:     General: Skin is warm and dry.   Neurological:      General: No focal deficit present.         Assessment/Plan   Problem List Items Addressed This Visit       Attention-deficit/hyperactivity disorder     Other Visit Diagnoses       Postoperative hypothyroidism    -  Primary    Relevant Medications    levothyroxine (Synthroid) 25 mcg tablet    Other Relevant Orders    TSH    T4, free    Elevated alkaline phosphatase level        Relevant Orders    Alkaline Phosphatase, Isoenzymes    Calcium    Comprehensive Metabolic Panel    Hypercalcemia        Relevant Orders    Vitamin D 1,25 Dihydroxy (for eval of hypercalcemia)    PTH, " intact    Phosphorus    Alkaline Phosphatase, Isoenzymes    Calcium    Comprehensive Metabolic Panel    Prediabetes        Relevant Orders    Hemoglobin A1C    Symptoms, such as flushing, sleeplessness, headache, lack of concentration, associated with the menopause        Relevant Orders    Referral to Obstetrics / Gynecology    Screening for cervical cancer        Relevant Orders    Referral to Obstetrics / Gynecology

## 2024-10-03 NOTE — PATIENT INSTRUCTIONS
Begin levothyroxine 25 mcg, 1 tablet on an empty stomach every morning.  Wait  30 minutes before eating after taking the pill.    Yeah we will recheck your thyroid blood tests and a few other tests and in 3 months  There is an order in the system dated after January 1 for that will recheck your thyroid   You should be fasting for this blood test.     I want you to get a blood test in the next week or two to look more into the finding with your calcium.  You do not need to fast for this blood test.     See me again in mid January or early February.

## 2024-10-04 ENCOUNTER — TELEPHONE (OUTPATIENT)
Dept: PRIMARY CARE | Facility: CLINIC | Age: 59
End: 2024-10-04
Payer: COMMERCIAL

## 2024-10-04 DIAGNOSIS — F90.0 ATTENTION DEFICIT HYPERACTIVITY DISORDER (ADHD), PREDOMINANTLY INATTENTIVE TYPE: ICD-10-CM

## 2024-10-04 LAB
ALP BONE SERPL-CCNC: 72 U/L (ref 0–55)
ALP LIVER SERPL-CCNC: 95 U/L (ref 0–94)
ALP OTHER SERPL-CCNC: 0 U/L
ALP SERPL-CCNC: 167 U/L (ref 40–120)

## 2024-10-04 RX ORDER — DEXTROAMPHETAMINE SACCHARATE, AMPHETAMINE ASPARTATE MONOHYDRATE, DEXTROAMPHETAMINE SULFATE AND AMPHETAMINE SULFATE 5; 5; 5; 5 MG/1; MG/1; MG/1; MG/1
20 CAPSULE, EXTENDED RELEASE ORAL EVERY MORNING
Qty: 30 CAPSULE | Refills: 0 | Status: SHIPPED | OUTPATIENT
Start: 2024-10-04 | End: 2024-11-03

## 2024-10-04 NOTE — TELEPHONE ENCOUNTER
Huang  at [facility] on [address] said the patient is trying to fill her Adderal that was sent on 10/3 and also state not to fill before   12/2    He said the patient claim this is the only script and that there was no other script    Pharmacist want authorization to fill    Please advise      Huang [phone]

## 2024-10-23 ENCOUNTER — LAB (OUTPATIENT)
Dept: LAB | Facility: LAB | Age: 59
End: 2024-10-23
Payer: COMMERCIAL

## 2024-10-23 DIAGNOSIS — R74.8 ELEVATED ALKALINE PHOSPHATASE LEVEL: ICD-10-CM

## 2024-10-23 DIAGNOSIS — E83.52 HYPERCALCEMIA: ICD-10-CM

## 2024-10-23 PROCEDURE — 84080 ASSAY ALKALINE PHOSPHATASES: CPT

## 2024-10-23 PROCEDURE — 83970 ASSAY OF PARATHORMONE: CPT

## 2024-10-23 PROCEDURE — 82652 VIT D 1 25-DIHYDROXY: CPT

## 2024-10-23 PROCEDURE — 36415 COLL VENOUS BLD VENIPUNCTURE: CPT

## 2024-10-23 PROCEDURE — 82310 ASSAY OF CALCIUM: CPT

## 2024-10-23 PROCEDURE — 84100 ASSAY OF PHOSPHORUS: CPT

## 2024-10-24 LAB
CALCIUM SERPL-MCNC: 9.9 MG/DL (ref 8.6–10.6)
PHOSPHATE SERPL-MCNC: 2.7 MG/DL (ref 2.5–4.9)
PTH-INTACT SERPL-MCNC: 130.3 PG/ML (ref 18.5–88)

## 2024-10-25 LAB — 1,25(OH)2D SERPL-MCNC: 96.9 PG/ML (ref 19.9–79.3)

## 2024-10-27 DIAGNOSIS — E21.0 HYPERPARATHYROIDISM, PRIMARY (MULTI): ICD-10-CM

## 2024-10-27 DIAGNOSIS — E83.52 HYPERCALCEMIA: Primary | ICD-10-CM

## 2024-10-27 LAB
ALP BONE SERPL-CCNC: 58 U/L (ref 0–55)
ALP LIVER SERPL-CCNC: 58 U/L (ref 0–94)
ALP OTHER SERPL-CCNC: 0 U/L
ALP SERPL-CCNC: 115 U/L (ref 40–120)

## 2024-10-28 ENCOUNTER — TELEPHONE (OUTPATIENT)
Dept: PRIMARY CARE | Facility: CLINIC | Age: 59
End: 2024-10-28
Payer: COMMERCIAL

## 2024-11-04 DIAGNOSIS — F90.0 ATTENTION DEFICIT HYPERACTIVITY DISORDER (ADHD), PREDOMINANTLY INATTENTIVE TYPE: ICD-10-CM

## 2024-11-04 RX ORDER — DEXTROAMPHETAMINE SACCHARATE, AMPHETAMINE ASPARTATE MONOHYDRATE, DEXTROAMPHETAMINE SULFATE AND AMPHETAMINE SULFATE 5; 5; 5; 5 MG/1; MG/1; MG/1; MG/1
20 CAPSULE, EXTENDED RELEASE ORAL EVERY MORNING
Qty: 30 CAPSULE | Refills: 0 | Status: SHIPPED | OUTPATIENT
Start: 2024-11-04 | End: 2024-12-04

## 2024-11-04 NOTE — TELEPHONE ENCOUNTER
Refill    amphetamine-dextroamphetamine XR (Adderall XR) 20 mg 24 hr capsule ()  20 mg, Every morning           Summary: Take 1 capsule (20 mg) by mouth   once daily in the morning. Do not crush or   chew. PLEASE FILL TODAY 10/04/2024        DDM - DDM - [address]

## 2024-11-05 NOTE — TELEPHONE ENCOUNTER
Pt requesting refill of amphetamine/dextroamphetamine XR 20 mg one daily. I personally reviewed the OARRS report for this patient and found no concern for abuse, dependence or diversion. Refill sent per pt request.

## 2024-11-19 ENCOUNTER — HOSPITAL ENCOUNTER (OUTPATIENT)
Dept: RADIOLOGY | Facility: HOSPITAL | Age: 59
Discharge: HOME | End: 2024-11-19
Payer: COMMERCIAL

## 2024-11-19 DIAGNOSIS — E21.0 HYPERPARATHYROIDISM, PRIMARY (MULTI): ICD-10-CM

## 2024-11-19 DIAGNOSIS — E83.52 HYPERCALCEMIA: ICD-10-CM

## 2024-11-19 PROCEDURE — 78072 PARATHYRD PLANAR W/SPECT&CT: CPT | Performed by: NUCLEAR MEDICINE

## 2024-11-19 PROCEDURE — 78072 PARATHYRD PLANAR W/SPECT&CT: CPT

## 2024-11-19 PROCEDURE — A9500 TC99M SESTAMIBI: HCPCS | Performed by: INTERNAL MEDICINE

## 2024-11-19 PROCEDURE — 3430000001 HC RX 343 DIAGNOSTIC RADIOPHARMACEUTICALS: Performed by: INTERNAL MEDICINE

## 2024-11-19 RX ORDER — TETRAKIS(2-METHOXYISOBUTYLISOCYANIDE)COPPER(I) TETRAFLUOROBORATE 1 MG/ML
22.7 INJECTION, POWDER, LYOPHILIZED, FOR SOLUTION INTRAVENOUS
Status: COMPLETED | OUTPATIENT
Start: 2024-11-19 | End: 2024-11-19

## 2024-11-22 DIAGNOSIS — R73.03 PREDIABETES: ICD-10-CM

## 2024-11-22 DIAGNOSIS — E83.52 HYPERCALCEMIA: Primary | ICD-10-CM

## 2024-11-22 DIAGNOSIS — E67.3 HIGH VITAMIN D LEVEL: ICD-10-CM

## 2024-12-04 DIAGNOSIS — F90.0 ATTENTION DEFICIT HYPERACTIVITY DISORDER (ADHD), PREDOMINANTLY INATTENTIVE TYPE: ICD-10-CM

## 2024-12-05 RX ORDER — DEXTROAMPHETAMINE SACCHARATE, AMPHETAMINE ASPARTATE MONOHYDRATE, DEXTROAMPHETAMINE SULFATE AND AMPHETAMINE SULFATE 5; 5; 5; 5 MG/1; MG/1; MG/1; MG/1
20 CAPSULE, EXTENDED RELEASE ORAL EVERY MORNING
Qty: 30 CAPSULE | Refills: 0 | Status: SHIPPED | OUTPATIENT
Start: 2024-12-05 | End: 2025-01-04

## 2024-12-05 NOTE — TELEPHONE ENCOUNTER
Pt requesting refill for amphetamine/dextroamphetamine. I personally reviewed the OARRS report for this patient and found no concern for abuse, dependence or diversion. Refill sent per pt request.

## 2024-12-19 LAB — NON-UH HIE GP HPV: NEGATIVE

## 2024-12-23 LAB — NON-UH HIE THINPREP TIS PAP: NORMAL

## 2025-01-06 DIAGNOSIS — F90.0 ATTENTION DEFICIT HYPERACTIVITY DISORDER (ADHD), PREDOMINANTLY INATTENTIVE TYPE: ICD-10-CM

## 2025-01-07 RX ORDER — DEXTROAMPHETAMINE SACCHARATE, AMPHETAMINE ASPARTATE MONOHYDRATE, DEXTROAMPHETAMINE SULFATE AND AMPHETAMINE SULFATE 5; 5; 5; 5 MG/1; MG/1; MG/1; MG/1
20 CAPSULE, EXTENDED RELEASE ORAL EVERY MORNING
Qty: 30 CAPSULE | Refills: 0 | Status: SHIPPED | OUTPATIENT
Start: 2025-01-07 | End: 2025-02-06

## 2025-01-07 NOTE — TELEPHONE ENCOUNTER
I personally reviewed the OARRS report for this patient and found no concern for abuse, dependence or diversion. Refill sent per pt request.

## 2025-01-20 DIAGNOSIS — E89.0 POSTOPERATIVE HYPOTHYROIDISM: ICD-10-CM

## 2025-01-20 RX ORDER — LEVOTHYROXINE SODIUM 25 UG/1
25 TABLET ORAL DAILY
Qty: 30 TABLET | Refills: 3 | Status: SHIPPED | OUTPATIENT
Start: 2025-01-20 | End: 2025-01-23 | Stop reason: ALTCHOICE

## 2025-01-21 ENCOUNTER — LAB (OUTPATIENT)
Dept: LAB | Facility: LAB | Age: 60
End: 2025-01-21
Payer: COMMERCIAL

## 2025-01-21 DIAGNOSIS — E89.0 POSTOPERATIVE HYPOTHYROIDISM: ICD-10-CM

## 2025-01-21 DIAGNOSIS — R74.8 ELEVATED ALKALINE PHOSPHATASE LEVEL: ICD-10-CM

## 2025-01-21 DIAGNOSIS — R73.03 PREDIABETES: ICD-10-CM

## 2025-01-21 DIAGNOSIS — E83.52 HYPERCALCEMIA: ICD-10-CM

## 2025-01-21 DIAGNOSIS — E67.3 HIGH VITAMIN D LEVEL: ICD-10-CM

## 2025-01-21 PROCEDURE — 36415 COLL VENOUS BLD VENIPUNCTURE: CPT

## 2025-01-21 PROCEDURE — 84439 ASSAY OF FREE THYROXINE: CPT

## 2025-01-21 PROCEDURE — 83970 ASSAY OF PARATHORMONE: CPT

## 2025-01-21 PROCEDURE — 84443 ASSAY THYROID STIM HORMONE: CPT

## 2025-01-21 PROCEDURE — 82306 VITAMIN D 25 HYDROXY: CPT

## 2025-01-21 PROCEDURE — 82652 VIT D 1 25-DIHYDROXY: CPT

## 2025-01-21 PROCEDURE — 80053 COMPREHEN METABOLIC PANEL: CPT

## 2025-01-21 PROCEDURE — 83036 HEMOGLOBIN GLYCOSYLATED A1C: CPT

## 2025-01-22 LAB
25(OH)D3 SERPL-MCNC: 24 NG/ML (ref 30–100)
ALBUMIN SERPL BCP-MCNC: 4.3 G/DL (ref 3.4–5)
ALP SERPL-CCNC: 135 U/L (ref 33–136)
ALT SERPL W P-5'-P-CCNC: 21 U/L (ref 7–45)
ANION GAP SERPL CALC-SCNC: 13 MMOL/L (ref 10–20)
AST SERPL W P-5'-P-CCNC: 18 U/L (ref 9–39)
BILIRUB SERPL-MCNC: 0.3 MG/DL (ref 0–1.2)
BUN SERPL-MCNC: 11 MG/DL (ref 6–23)
CALCIUM SERPL-MCNC: 10.4 MG/DL (ref 8.6–10.6)
CHLORIDE SERPL-SCNC: 103 MMOL/L (ref 98–107)
CO2 SERPL-SCNC: 30 MMOL/L (ref 21–32)
CREAT SERPL-MCNC: 0.68 MG/DL (ref 0.5–1.05)
EGFRCR SERPLBLD CKD-EPI 2021: >90 ML/MIN/1.73M*2
EST. AVERAGE GLUCOSE BLD GHB EST-MCNC: 123 MG/DL
GLUCOSE SERPL-MCNC: 104 MG/DL (ref 74–99)
HBA1C MFR BLD: 5.9 %
POTASSIUM SERPL-SCNC: 5.2 MMOL/L (ref 3.5–5.3)
PROT SERPL-MCNC: 7.1 G/DL (ref 6.4–8.2)
PTH-INTACT SERPL-MCNC: 121.6 PG/ML (ref 18.5–88)
SODIUM SERPL-SCNC: 141 MMOL/L (ref 136–145)
T4 FREE SERPL-MCNC: 1.38 NG/DL (ref 0.78–1.48)
TSH SERPL-ACNC: 3.58 MIU/L (ref 0.44–3.98)

## 2025-01-23 ENCOUNTER — APPOINTMENT (OUTPATIENT)
Dept: PRIMARY CARE | Facility: CLINIC | Age: 60
End: 2025-01-23
Payer: COMMERCIAL

## 2025-01-23 VITALS
RESPIRATION RATE: 16 BRPM | DIASTOLIC BLOOD PRESSURE: 83 MMHG | HEART RATE: 102 BPM | WEIGHT: 188 LBS | SYSTOLIC BLOOD PRESSURE: 139 MMHG | HEIGHT: 62 IN | BODY MASS INDEX: 34.6 KG/M2

## 2025-01-23 DIAGNOSIS — E89.0 POST-SURGICAL HYPOTHYROIDISM: ICD-10-CM

## 2025-01-23 DIAGNOSIS — E83.52 HYPERCALCEMIA: ICD-10-CM

## 2025-01-23 DIAGNOSIS — Z78.0 ASYMPTOMATIC MENOPAUSAL STATE: ICD-10-CM

## 2025-01-23 DIAGNOSIS — M75.22 BILATERAL BICEPS TENDONITIS: ICD-10-CM

## 2025-01-23 DIAGNOSIS — M75.21 BILATERAL BICEPS TENDONITIS: ICD-10-CM

## 2025-01-23 DIAGNOSIS — M75.21 BICEPS TENDINITIS OF RIGHT UPPER EXTREMITY: ICD-10-CM

## 2025-01-23 DIAGNOSIS — E21.3 HYPERPARATHYROIDISM (MULTI): ICD-10-CM

## 2025-01-23 DIAGNOSIS — M75.21 BICEPS TENDONITIS OF BOTH SHOULDERS: ICD-10-CM

## 2025-01-23 DIAGNOSIS — E66.811 CLASS 1 OBESITY DUE TO EXCESS CALORIES WITH SERIOUS COMORBIDITY AND BODY MASS INDEX (BMI) OF 34.0 TO 34.9 IN ADULT: Primary | ICD-10-CM

## 2025-01-23 DIAGNOSIS — M75.22 BICEPS TENDONITIS OF BOTH SHOULDERS: ICD-10-CM

## 2025-01-23 DIAGNOSIS — Z12.31 VISIT FOR SCREENING MAMMOGRAM: ICD-10-CM

## 2025-01-23 DIAGNOSIS — R73.03 PREDIABETES: ICD-10-CM

## 2025-01-23 DIAGNOSIS — E66.09 CLASS 1 OBESITY DUE TO EXCESS CALORIES WITH SERIOUS COMORBIDITY AND BODY MASS INDEX (BMI) OF 34.0 TO 34.9 IN ADULT: Primary | ICD-10-CM

## 2025-01-23 LAB — 1,25(OH)2D SERPL-MCNC: 65.9 PG/ML (ref 19.9–79.3)

## 2025-01-23 PROCEDURE — 3079F DIAST BP 80-89 MM HG: CPT | Performed by: INTERNAL MEDICINE

## 2025-01-23 PROCEDURE — 99214 OFFICE O/P EST MOD 30 MIN: CPT | Performed by: INTERNAL MEDICINE

## 2025-01-23 PROCEDURE — 3008F BODY MASS INDEX DOCD: CPT | Performed by: INTERNAL MEDICINE

## 2025-01-23 PROCEDURE — 3075F SYST BP GE 130 - 139MM HG: CPT | Performed by: INTERNAL MEDICINE

## 2025-01-23 RX ORDER — LEVOTHYROXINE SODIUM 50 UG/1
50 TABLET ORAL DAILY
Qty: 30 TABLET | Refills: 2 | Status: SHIPPED | OUTPATIENT
Start: 2025-01-23

## 2025-01-23 ASSESSMENT — PAIN SCALES - GENERAL: PAINLEVEL_OUTOF10: 0-NO PAIN

## 2025-01-23 NOTE — PROGRESS NOTES
"Subjective   Patient ID: Stephanie Jones is a 59 y.o. female who presents for Follow-up.    HPI     She said she is feeling OK     Her only complaint is her weight   She has gained 18 pounds over one year   She is eating small breakfast and a sandwich or frozen dinner   She was able to loose 75 lbs after her second pregnancy     She had been been a regular exerciser but has not done that in the past     She said that her constipation improved     Her PTH is still elevated: she had a negative parathyroid gland scan   Will refer to endocrinology    Rotator cuff repair right and also a biceps tear repaired   She is having pain constantly in her right arm starting at the deltoid   Also having left hand  She saw Dr Rushing in 2022 and had an injection.     Review of Systems      Current Outpatient Medications:     amphetamine-dextroamphetamine XR (Adderall XR) 20 mg 24 hr capsule, Take 1 capsule (20 mg) by mouth once daily in the morning. Do not crush or chew. PLEASE FILL TODAY 10/04/2024, Disp: 30 capsule, Rfl: 0    levothyroxine (Synthroid, Levoxyl) 25 mcg tablet, Take 1 tablet (25 mcg) by mouth early in the morning.. Take on an empty stomach at the same time each day, either 30 to 60 minutes prior to breakfast, Disp: 30 tablet, Rfl: 3    losartan (Cozaar) 100 mg tablet, TAKE 1 TABLET BY MOUTH ONCE DAILY, Disp: 30 tablet, Rfl: 5    pantoprazole (ProtoNix) 40 mg EC tablet, TAKE 1 TABLET BY MOUTH ONCE DAILY IN THE MORNING. take before meals.d o not crush, chew, split, Disp: 30 tablet, Rfl: 5    terbinafine (LamISIL) 250 mg tablet, Take 1 tablet (250 mg) by mouth once daily., Disp: 30 tablet, Rfl: 5    triamcinolone (Kenalog) 0.025 % cream, Apply 1 Application topically 3 times a day. (Patient not taking: Reported on 10/19/2023), Disp: 89 g, Rfl: 0    Objective   /83   Pulse 102   Resp 16   Ht 1.575 m (5' 2\")   Wt 85.3 kg (188 lb)   BMI 34.39 kg/m²     Physical Exam  Eyes:      Conjunctiva/sclera: Conjunctivae " normal.      Pupils: Pupils are equal, round, and reactive to light.   Neck:      Thyroid: No thyroid mass.   Cardiovascular:      Rate and Rhythm: Normal rate and regular rhythm.      Heart sounds: Normal heart sounds.   Pulmonary:      Effort: Pulmonary effort is normal.      Breath sounds: Normal breath sounds.       Assessment/Plan   Problem List Items Addressed This Visit               Relevant Orders  Biceps tendonitis of both shoulders       Relevant Orders   Referral to Orthopaedic Surgery       Referral to Endocrinology     Other Visit Diagnoses       Hypercalcemia        Relevant Orders    Referral to Endocrinology    Calcium    Hyperparathyroidism (Multi)        Relevant Orders    Referral to Endocrinology    Calcium    Post-surgical hypothyroidism    increase dose to 50 mcg    Relevant Medications    levothyroxine (Synthroid, Levoxyl) 50 mcg tablet    Other Relevant Orders    TSH    T4, free    Prediabetes        Relevant Orders    Hemoglobin A1C                Visit for screening mammogram        Relevant Orders    BI mammo bilateral screening tomosynthesis

## 2025-01-23 NOTE — PATIENT INSTRUCTIONS
Schedule with endocrinology through .     Please schedule Bone Density ( DEXA) and mammogram.   The bone density is important for looking into the parathyroid issue.     Start taking two of the 25 mcg levothyroxine at one time until you are done.  Then start taking one 50 mcg dose daily.   I sent in a new RX.     See me again in 3 months .   Get blood test done close to next visit, do not need to be fasting.

## 2025-02-10 DIAGNOSIS — F90.0 ATTENTION DEFICIT HYPERACTIVITY DISORDER (ADHD), PREDOMINANTLY INATTENTIVE TYPE: ICD-10-CM

## 2025-02-11 RX ORDER — DEXTROAMPHETAMINE SACCHARATE, AMPHETAMINE ASPARTATE MONOHYDRATE, DEXTROAMPHETAMINE SULFATE AND AMPHETAMINE SULFATE 5; 5; 5; 5 MG/1; MG/1; MG/1; MG/1
20 CAPSULE, EXTENDED RELEASE ORAL EVERY MORNING
Qty: 30 CAPSULE | Refills: 0 | Status: SHIPPED | OUTPATIENT
Start: 2025-02-11 | End: 2025-03-13

## 2025-02-11 NOTE — TELEPHONE ENCOUNTER
Pt requesting refill of adderall. I personally reviewed the OARRS report for this patient and found no concern for abuse, dependence or diversion. Refill sent per pt request.

## 2025-02-26 ENCOUNTER — HOSPITAL ENCOUNTER (OUTPATIENT)
Dept: RADIOLOGY | Facility: CLINIC | Age: 60
Discharge: HOME | End: 2025-02-26
Payer: COMMERCIAL

## 2025-02-26 VITALS — BODY MASS INDEX: 34.61 KG/M2 | WEIGHT: 188.05 LBS | HEIGHT: 62 IN

## 2025-02-26 DIAGNOSIS — Z78.0 ASYMPTOMATIC MENOPAUSAL STATE: ICD-10-CM

## 2025-02-26 DIAGNOSIS — Z12.31 ENCOUNTER FOR SCREENING MAMMOGRAM FOR MALIGNANT NEOPLASM OF BREAST: ICD-10-CM

## 2025-02-26 PROCEDURE — 77080 DXA BONE DENSITY AXIAL: CPT | Performed by: RADIOLOGY

## 2025-02-26 PROCEDURE — 77080 DXA BONE DENSITY AXIAL: CPT

## 2025-02-26 PROCEDURE — 77063 BREAST TOMOSYNTHESIS BI: CPT | Performed by: RADIOLOGY

## 2025-02-26 PROCEDURE — 77067 SCR MAMMO BI INCL CAD: CPT

## 2025-02-26 PROCEDURE — 77067 SCR MAMMO BI INCL CAD: CPT | Performed by: RADIOLOGY

## 2025-03-07 ENCOUNTER — PATIENT MESSAGE (OUTPATIENT)
Dept: PRIMARY CARE | Facility: CLINIC | Age: 60
End: 2025-03-07
Payer: COMMERCIAL

## 2025-03-07 DIAGNOSIS — F90.0 ATTENTION DEFICIT HYPERACTIVITY DISORDER (ADHD), PREDOMINANTLY INATTENTIVE TYPE: ICD-10-CM

## 2025-03-07 DIAGNOSIS — B35.1 TOENAIL FUNGUS: ICD-10-CM

## 2025-03-07 NOTE — PATIENT COMMUNICATION
Please refill    terbinafine (LamISIL) 250 mg tablet   250 mg, Daily   amphetamine-dextroamphetamine XR (Adderall XR) 20 mg 24 hr capsule   20 mg, Every morning   Drug [place]

## 2025-03-10 RX ORDER — TERBINAFINE HYDROCHLORIDE 250 MG/1
250 TABLET ORAL DAILY
Qty: 30 TABLET | Refills: 5 | Status: SHIPPED | OUTPATIENT
Start: 2025-03-10

## 2025-03-10 RX ORDER — DEXTROAMPHETAMINE SACCHARATE, AMPHETAMINE ASPARTATE MONOHYDRATE, DEXTROAMPHETAMINE SULFATE AND AMPHETAMINE SULFATE 5; 5; 5; 5 MG/1; MG/1; MG/1; MG/1
20 CAPSULE, EXTENDED RELEASE ORAL EVERY MORNING
Qty: 30 CAPSULE | Refills: 0 | Status: SHIPPED | OUTPATIENT
Start: 2025-03-10 | End: 2025-04-09

## 2025-03-10 NOTE — TELEPHONE ENCOUNTER
Pt req refill on adderall. I personally reviewed the OARRS report for this patient and found no concern for abuse, dependence or diversion. Refill sent per pt request.

## 2025-03-22 DIAGNOSIS — I10 BENIGN ESSENTIAL HTN: ICD-10-CM

## 2025-03-24 RX ORDER — LOSARTAN POTASSIUM 100 MG/1
100 TABLET ORAL DAILY
Qty: 30 TABLET | Refills: 2 | Status: SHIPPED | OUTPATIENT
Start: 2025-03-24

## 2025-03-31 DIAGNOSIS — K21.9 GASTROESOPHAGEAL REFLUX DISEASE, UNSPECIFIED WHETHER ESOPHAGITIS PRESENT: ICD-10-CM

## 2025-04-01 RX ORDER — PANTOPRAZOLE SODIUM 40 MG/1
TABLET, DELAYED RELEASE ORAL
Qty: 30 TABLET | Refills: 5 | Status: SHIPPED | OUTPATIENT
Start: 2025-04-01

## 2025-04-01 NOTE — TELEPHONE ENCOUNTER
Refill    pantoprazole (ProtoNix) 40 mg EC tablet   TAKE 1 TABLET BY MOUTH ONCE DAILY IN THE MORNING. take before meals.d o not crush, chew, split   Drug [place]   Awake/Alert

## 2025-04-10 ENCOUNTER — TELEPHONE (OUTPATIENT)
Dept: ENDOCRINOLOGY | Facility: CLINIC | Age: 60
End: 2025-04-10
Payer: COMMERCIAL

## 2025-04-11 ENCOUNTER — PATIENT MESSAGE (OUTPATIENT)
Dept: PRIMARY CARE | Facility: CLINIC | Age: 60
End: 2025-04-11
Payer: COMMERCIAL

## 2025-04-11 DIAGNOSIS — F90.0 ATTENTION DEFICIT HYPERACTIVITY DISORDER (ADHD), PREDOMINANTLY INATTENTIVE TYPE: ICD-10-CM

## 2025-04-11 RX ORDER — DEXTROAMPHETAMINE SACCHARATE, AMPHETAMINE ASPARTATE MONOHYDRATE, DEXTROAMPHETAMINE SULFATE AND AMPHETAMINE SULFATE 5; 5; 5; 5 MG/1; MG/1; MG/1; MG/1
20 CAPSULE, EXTENDED RELEASE ORAL EVERY MORNING
Qty: 30 CAPSULE | Refills: 0 | Status: SHIPPED | OUTPATIENT
Start: 2025-04-11 | End: 2025-05-11

## 2025-05-06 ENCOUNTER — PATIENT MESSAGE (OUTPATIENT)
Dept: PRIMARY CARE | Facility: CLINIC | Age: 60
End: 2025-05-06
Payer: COMMERCIAL

## 2025-05-06 DIAGNOSIS — E89.0 POST-SURGICAL HYPOTHYROIDISM: ICD-10-CM

## 2025-05-07 RX ORDER — LEVOTHYROXINE SODIUM 50 UG/1
50 TABLET ORAL DAILY
Qty: 90 TABLET | Refills: 1 | Status: SHIPPED | OUTPATIENT
Start: 2025-05-07

## 2025-05-12 ENCOUNTER — PATIENT MESSAGE (OUTPATIENT)
Dept: PRIMARY CARE | Facility: CLINIC | Age: 60
End: 2025-05-12
Payer: COMMERCIAL

## 2025-05-12 DIAGNOSIS — F90.0 ATTENTION DEFICIT HYPERACTIVITY DISORDER (ADHD), PREDOMINANTLY INATTENTIVE TYPE: ICD-10-CM

## 2025-05-13 ENCOUNTER — TELEPHONE (OUTPATIENT)
Facility: CLINIC | Age: 60
End: 2025-05-13
Payer: COMMERCIAL

## 2025-05-13 RX ORDER — DEXTROAMPHETAMINE SACCHARATE, AMPHETAMINE ASPARTATE MONOHYDRATE, DEXTROAMPHETAMINE SULFATE AND AMPHETAMINE SULFATE 5; 5; 5; 5 MG/1; MG/1; MG/1; MG/1
20 CAPSULE, EXTENDED RELEASE ORAL EVERY MORNING
Qty: 30 CAPSULE | Refills: 0 | Status: SHIPPED | OUTPATIENT
Start: 2025-05-13 | End: 2025-06-12

## 2025-05-13 NOTE — TELEPHONE ENCOUNTER
"Called and spoke to Stephanie regarding needing to reschedule her 07/30/2025 appt with . She said, \"Someone already called her regarding this and she will call us back.\".   "

## 2025-05-14 ENCOUNTER — TELEPHONE (OUTPATIENT)
Facility: CLINIC | Age: 60
End: 2025-05-14
Payer: COMMERCIAL

## 2025-05-14 NOTE — TELEPHONE ENCOUNTER
Called Stephanie to reschedule her 07/30/2025 appt with . She had an opening on May 15th; therefore, I put her on there. Which made Stephanie very happy.

## 2025-05-15 ENCOUNTER — OFFICE VISIT (OUTPATIENT)
Facility: CLINIC | Age: 60
End: 2025-05-15
Payer: COMMERCIAL

## 2025-05-15 VITALS
RESPIRATION RATE: 16 BRPM | BODY MASS INDEX: 35.41 KG/M2 | HEIGHT: 62 IN | SYSTOLIC BLOOD PRESSURE: 118 MMHG | DIASTOLIC BLOOD PRESSURE: 80 MMHG | HEART RATE: 117 BPM | WEIGHT: 192.4 LBS

## 2025-05-15 DIAGNOSIS — E66.09 CLASS 1 OBESITY DUE TO EXCESS CALORIES WITH SERIOUS COMORBIDITY AND BODY MASS INDEX (BMI) OF 34.0 TO 34.9 IN ADULT: ICD-10-CM

## 2025-05-15 DIAGNOSIS — E83.52 HYPERCALCEMIA: ICD-10-CM

## 2025-05-15 DIAGNOSIS — E03.9 HYPOTHYROIDISM, UNSPECIFIED TYPE: ICD-10-CM

## 2025-05-15 DIAGNOSIS — E04.2 MULTIPLE THYROID NODULES: ICD-10-CM

## 2025-05-15 DIAGNOSIS — E21.3 HYPERPARATHYROIDISM (MULTI): Primary | ICD-10-CM

## 2025-05-15 DIAGNOSIS — E66.811 CLASS 1 OBESITY DUE TO EXCESS CALORIES WITH SERIOUS COMORBIDITY AND BODY MASS INDEX (BMI) OF 34.0 TO 34.9 IN ADULT: ICD-10-CM

## 2025-05-15 PROCEDURE — 3008F BODY MASS INDEX DOCD: CPT | Performed by: INTERNAL MEDICINE

## 2025-05-15 PROCEDURE — 1036F TOBACCO NON-USER: CPT | Performed by: INTERNAL MEDICINE

## 2025-05-15 PROCEDURE — 3079F DIAST BP 80-89 MM HG: CPT | Performed by: INTERNAL MEDICINE

## 2025-05-15 PROCEDURE — 99214 OFFICE O/P EST MOD 30 MIN: CPT | Performed by: INTERNAL MEDICINE

## 2025-05-15 PROCEDURE — 99204 OFFICE O/P NEW MOD 45 MIN: CPT | Performed by: INTERNAL MEDICINE

## 2025-05-15 PROCEDURE — 3074F SYST BP LT 130 MM HG: CPT | Performed by: INTERNAL MEDICINE

## 2025-05-15 RX ORDER — SEMAGLUTIDE 0.25 MG/.5ML
0.25 INJECTION, SOLUTION SUBCUTANEOUS
Qty: 2 ML | Refills: 0 | Status: SHIPPED | OUTPATIENT
Start: 2025-05-15

## 2025-05-15 ASSESSMENT — ENCOUNTER SYMPTOMS
SHORTNESS OF BREATH: 0
FATIGUE: 0
COUGH: 0
HEADACHES: 0
VOMITING: 0
PALPITATIONS: 0
NAUSEA: 0
FEVER: 0
CHILLS: 0
DIARRHEA: 0

## 2025-05-15 NOTE — LETTER
May 15, 2025     Macey Almaguer DO  [address]  [facility], [address]  [facility] [address]    Patient: Stephanie Jones   YOB: 1965   Date of Visit: 5/15/2025       Dear Dr. Macey Almaguer DO:    Thank you for referring Stephanie Jones to me for evaluation. Below are my notes for this consultation.  If you have questions, please do not hesitate to call me. I look forward to following your patient along with you.       Sincerely,     Aliyah Galdamez MD      CC: No Recipients  ______________________________________________________________________________________    Endocrinology New Patient Consult  Subjective   Patient ID: Stephanie Jones is a 59 y.o. female who presents for Hypothyroidism and Weight Gain. Patient was referred by Dr. Almaguer    PCP: Macey Almaguer DO    HPI  59-year-old referred by Dr. Almaguer for evaluation of hypercalcemia also with a history of multinodular goiter hypothyroidism and obesity.  She has had intermittent elevated calcium values for at least the last year.  She denies any kidney stones or bone fractures.  She did have a bone density earlier this year that showed very mild osteopenia.  She does not take calcium or vitamin D supplements regularly.  She does not take Tums.  She has gained about 20 pounds in the last year and is very frustrated with her weight.  She had lost a great deal of weight after her last child and then started gaining it back around menopause.  Of note she did have a hemithyroidectomy with Dr. Timothy Antonio for left toxic nodule in December 2023.  She has not had an ultrasound since but did have some small nodules on the remaining gland.  She was started on thyroid medication shortly after surgery due to hypothyroidism.  She has been on 50 mcg of levothyroxine for quite some time.  She has no history of pancreatitis.    Review of Systems   Constitutional:  Negative for chills, fatigue and fever.   Respiratory:  Negative for cough and  shortness of breath.    Cardiovascular:  Negative for chest pain and palpitations.   Gastrointestinal:  Negative for diarrhea, nausea and vomiting.   Neurological:  Negative for headaches.       Problem List[1]    Medical History[2]     Surgical History[3]     Tobacco Use History[4]   Social History     Substance and Sexual Activity   Alcohol Use Yes   • Alcohol/week: 4.0 standard drinks of alcohol   • Types: 4 Cans of beer per week      Marital status: Single  Employment: Insurance verification    Family History[5]     Home Meds:  Current Outpatient Medications   Medication Instructions   • amphetamine-dextroamphetamine XR (Adderall XR) 20 mg 24 hr capsule 20 mg, oral, Every morning   • levothyroxine (SYNTHROID, LEVOXYL) 50 mcg, oral, Daily, Take on an empty stomach at the same time each day, either 30 to 60 minutes prior to breakfast . NEW DOSE   • losartan (COZAAR) 100 mg, oral, Daily   • pantoprazole (ProtoNix) 40 mg EC tablet TAKE 1 TABLET BY MOUTH ONCE DAILY IN THE MORNING. take before meals.d o not crush, chew, split   • terbinafine (LAMISIL) 250 mg, oral, Daily        RX Allergies[6]     Objective   Vitals:    05/15/25 1346   BP: 118/80   Pulse: (!) 117   Resp: 16      Vitals:    05/15/25 1346   Weight: 87.3 kg (192 lb 6.4 oz)      Body mass index is 35.18 kg/m².   Physical Exam  Constitutional:       Appearance: Normal appearance. She is overweight.   HENT:      Head: Normocephalic and atraumatic.   Neck:      Thyroid: No thyroid mass, thyromegaly or thyroid tenderness.      Comments: Small remaining thyroid gland  Cardiovascular:      Rate and Rhythm: Normal rate and regular rhythm.      Heart sounds: No murmur heard.     No gallop.   Pulmonary:      Effort: Pulmonary effort is normal.      Breath sounds: Normal breath sounds.   Abdominal:      Palpations: Abdomen is soft.      Comments: benign   Neurological:      General: No focal deficit present.      Mental Status: She is alert and oriented to person,  place, and time.      Deep Tendon Reflexes: Reflexes are normal and symmetric.   Psychiatric:         Behavior: Behavior is cooperative.         Labs:  Lab Results   Component Value Date    HGBA1C 5.9 (H) 01/21/2025    TSH 3.58 01/21/2025    FREET4 1.38 01/21/2025      Lab Results   Component Value Date    THYROIDPAB 43 10/23/2023    TSI <1.0 10/23/2023        Assessment/Plan   Assessment & Plan  Class 1 obesity due to excess calories with serious comorbidity and body mass index (BMI) of 34.0 to 34.9 in adult    Orders:  •  Referral to Endocrinology  •  semaglutide, weight loss, (Wegovy) 0.25 mg/0.5 mL pen injector; Inject 0.25 mg under the skin every 7 days.    Hypercalcemia    Orders:  •  Referral to Endocrinology  •  Parathyroid Hormone, Intact; Future  •  Vitamin D 25-Hydroxy,Total (for eval of Vitamin D levels); Future  •  Renal Function Panel; Future  •  Calcium, 24 Hour Urine; Future  •  Creatinine, 24 Hour Urine; Future    Hyperparathyroidism (Multi)    Orders:  •  Referral to Endocrinology  •  Parathyroid Hormone, Intact; Future  •  Vitamin D 25-Hydroxy,Total (for eval of Vitamin D levels); Future  •  Renal Function Panel; Future  •  Calcium, 24 Hour Urine; Future  •  Creatinine, 24 Hour Urine; Future    Multiple thyroid nodules    Orders:  •  US thyroid; Future    Hypothyroidism, unspecified type    Orders:  •  TSH with reflex to Free T4 if abnormal; Future  59-year-old here for evaluation of hyperparathyroidism hypothyroidism Ultiva thyroid nodules obesity.  We discussed her issues in detail:  Hyperparathyroidism: We discussed parameters for parathyroid intervention.  She did have a negative sestamibi scan however we discussed that this is typically used for surgical planning and not for determination of need for surgical therapy for parent hyperparathyroidism.  We reviewed her calcium values which have been very erratic and on last check were normal.  I would recommend a calcium vitamin D and a PTH.  She  has also not had a 24-hour urine collection which I did order for her.  Her bone density was great earlier this year.  Further plans based on urine and blood testing    Hypothyroidism most recent TSH was low normal so we will recheck and adjust thyroid medication as needed    Multiple thyroid nodules: She did have thyroid nodules on her remaining gland so we will order thyroid ultrasound for her    Obesity: We discussed weight management and medication options.  She would like to see if Wegovy is covered through her insurance and we will call it in for her.  We will call her with her blood and urine test results in the near future as well as her ultrasound findings and go from there as far as further evaluation and treatment      Electronically signed by:  Aliyah Galdamez MD 05/15/25  1:52 PM         [1]  Patient Active Problem List  Diagnosis   • Anal symptoms   • Attention-deficit/hyperactivity disorder   • Biceps tendinitis of right upper extremity   • Chronic right shoulder pain   • Closed comedone   • GERD (gastroesophageal reflux disease)   • Hot flashes, menopausal   • Lateral epicondylitis of right elbow   • Low TSH level   • Pain of left clavicle   • Rhus dermatitis   • Ventral hernia without obstruction or gangrene   • Toxic multinodular goiter   • Lung nodules   • Benign essential HTN   • Nicotine dependence, unspecified, uncomplicated   • Toxic solitary thyroid nodule   • ADHD   • Obesity   • Elevated blood pressure reading without diagnosis of hypertension   [2]  Past Medical History:  Diagnosis Date   • Acute vaginitis 08/31/2018    Bacterial vaginosis   • Dyshidrosis (pompholyx) 06/10/2015    Dyshidrotic eczema   • Fever, unspecified 03/03/2021    Fever and chills   • Nicotine dependence, unspecified, uncomplicated 01/15/2018    Tobacco dependence   • Personal history of diseases of the skin and subcutaneous tissue 01/06/2015    History of acne   • Personal history of other medical treatment     History  of EKG   • Personal history of other medical treatment     H/O mammogram   • Personal history of other mental and behavioral disorders 06/10/2015    History of depression   • Superficial mycosis, unspecified 2017    Fungal infection of skin   • Urinary tract infection, site not specified 2019    Acute UTI   [3]  Past Surgical History:  Procedure Laterality Date   • APPENDECTOMY  2014    Appendectomy   • BREAST SURGERY Bilateral     breast lift   •  SECTION, CLASSIC  2022     Section   • HERNIA REPAIR  2014    Hernia Repair x 3 umbillical   • ROTATOR CUFF REPAIR  2014    Rotator Cuff Repair   • SINUS SURGERY  2014    Sinus Surgery   • TONSILLECTOMY  2017    Tonsillectomy   [4]  Social History  Tobacco Use   Smoking Status Never   • Passive exposure: Never   Smokeless Tobacco Never   [5]  Family History  Problem Relation Name Age of Onset   • Breast cancer Mother  60   • Other (venous thromboembolism) Mother     • Prostate cancer Father     • Other (venous thromboembolism) Brother     • Breast cancer Mother's Sister  60   [6]  No Known Allergies       [1]  Patient Active Problem List  Diagnosis   • Anal symptoms   • Attention-deficit/hyperactivity disorder   • Biceps tendinitis of right upper extremity   • Chronic right shoulder pain   • Closed comedone   • GERD (gastroesophageal reflux disease)   • Hot flashes, menopausal   • Lateral epicondylitis of right elbow   • Low TSH level   • Pain of left clavicle   • Rhus dermatitis   • Ventral hernia without obstruction or gangrene   • Toxic multinodular goiter   • Lung nodules   • Benign essential HTN   • Nicotine dependence, unspecified, uncomplicated   • Toxic solitary thyroid nodule   • ADHD   • Obesity   • Elevated blood pressure reading without diagnosis of hypertension   [2]  Past Medical History:  Diagnosis Date   • Acute vaginitis 2018    Bacterial vaginosis   • Dyshidrosis (pompholyx) 06/10/2015     Dyshidrotic eczema   • Fever, unspecified 2021    Fever and chills   • Nicotine dependence, unspecified, uncomplicated 01/15/2018    Tobacco dependence   • Personal history of diseases of the skin and subcutaneous tissue 2015    History of acne   • Personal history of other medical treatment     History of EKG   • Personal history of other medical treatment     H/O mammogram   • Personal history of other mental and behavioral disorders 06/10/2015    History of depression   • Superficial mycosis, unspecified 2017    Fungal infection of skin   • Urinary tract infection, site not specified 2019    Acute UTI   [3]  Past Surgical History:  Procedure Laterality Date   • APPENDECTOMY  2014    Appendectomy   • BREAST SURGERY Bilateral     breast lift   •  SECTION, CLASSIC  2022     Section   • HERNIA REPAIR  2014    Hernia Repair x 3 umbillical   • ROTATOR CUFF REPAIR  2014    Rotator Cuff Repair   • SINUS SURGERY  2014    Sinus Surgery   • TONSILLECTOMY  2017    Tonsillectomy   [4]  Social History  Tobacco Use   Smoking Status Never   • Passive exposure: Never   Smokeless Tobacco Never   [5]  Family History  Problem Relation Name Age of Onset   • Breast cancer Mother  60   • Other (venous thromboembolism) Mother     • Prostate cancer Father     • Other (venous thromboembolism) Brother     • Breast cancer Mother's Sister  60   [6]  No Known Allergies

## 2025-05-15 NOTE — PATIENT INSTRUCTIONS
Blood and urine testing in the near future  Please schedule thyroid ultrasound  Please work on diet and exercise and Wegovy will be called in for you  Further plans based on test results  Keep working on diet and exercise  Please call or message with any concerns or questions

## 2025-05-15 NOTE — ASSESSMENT & PLAN NOTE
Orders:    Referral to Endocrinology    semaglutide, weight loss, (Wegovy) 0.25 mg/0.5 mL pen injector; Inject 0.25 mg under the skin every 7 days.

## 2025-05-15 NOTE — PROGRESS NOTES
Endocrinology New Patient Consult  Subjective   Patient ID: Stephanie Jones is a 59 y.o. female who presents for Hypothyroidism and Weight Gain. Patient was referred by Dr. Almaguer    PCP: Macey Almaguer DO    HPI  59-year-old referred by Dr. Almaguer for evaluation of hypercalcemia also with a history of multinodular goiter hypothyroidism and obesity.  She has had intermittent elevated calcium values for at least the last year.  She denies any kidney stones or bone fractures.  She did have a bone density earlier this year that showed very mild osteopenia.  She does not take calcium or vitamin D supplements regularly.  She does not take Tums.  She has gained about 20 pounds in the last year and is very frustrated with her weight.  She had lost a great deal of weight after her last child and then started gaining it back around menopause.  Of note she did have a hemithyroidectomy with Dr. Timothy Antonio for left toxic nodule in December 2023.  She has not had an ultrasound since but did have some small nodules on the remaining gland.  She was started on thyroid medication shortly after surgery due to hypothyroidism.  She has been on 50 mcg of levothyroxine for quite some time.  She has no history of pancreatitis.    Review of Systems   Constitutional:  Negative for chills, fatigue and fever.   Respiratory:  Negative for cough and shortness of breath.    Cardiovascular:  Negative for chest pain and palpitations.   Gastrointestinal:  Negative for diarrhea, nausea and vomiting.   Neurological:  Negative for headaches.       Problem List[1]    Medical History[2]     Surgical History[3]     Tobacco Use History[4]   Social History     Substance and Sexual Activity   Alcohol Use Yes    Alcohol/week: 4.0 standard drinks of alcohol    Types: 4 Cans of beer per week      Marital status: Single  Employment: Insurance verification    Family History[5]     Home Meds:  Current Outpatient Medications   Medication Instructions     amphetamine-dextroamphetamine XR (Adderall XR) 20 mg 24 hr capsule 20 mg, oral, Every morning    levothyroxine (SYNTHROID, LEVOXYL) 50 mcg, oral, Daily, Take on an empty stomach at the same time each day, either 30 to 60 minutes prior to breakfast . NEW DOSE    losartan (COZAAR) 100 mg, oral, Daily    pantoprazole (ProtoNix) 40 mg EC tablet TAKE 1 TABLET BY MOUTH ONCE DAILY IN THE MORNING. take before meals.d o not crush, chew, split    terbinafine (LAMISIL) 250 mg, oral, Daily        RX Allergies[6]     Objective   Vitals:    05/15/25 1346   BP: 118/80   Pulse: (!) 117   Resp: 16      Vitals:    05/15/25 1346   Weight: 87.3 kg (192 lb 6.4 oz)      Body mass index is 35.18 kg/m².   Physical Exam  Constitutional:       Appearance: Normal appearance. She is overweight.   HENT:      Head: Normocephalic and atraumatic.   Neck:      Thyroid: No thyroid mass, thyromegaly or thyroid tenderness.      Comments: Small remaining thyroid gland  Cardiovascular:      Rate and Rhythm: Normal rate and regular rhythm.      Heart sounds: No murmur heard.     No gallop.   Pulmonary:      Effort: Pulmonary effort is normal.      Breath sounds: Normal breath sounds.   Abdominal:      Palpations: Abdomen is soft.      Comments: benign   Neurological:      General: No focal deficit present.      Mental Status: She is alert and oriented to person, place, and time.      Deep Tendon Reflexes: Reflexes are normal and symmetric.   Psychiatric:         Behavior: Behavior is cooperative.         Labs:  Lab Results   Component Value Date    HGBA1C 5.9 (H) 01/21/2025    TSH 3.58 01/21/2025    FREET4 1.38 01/21/2025      Lab Results   Component Value Date    THYROIDPAB 43 10/23/2023    TSI <1.0 10/23/2023        Assessment/Plan   Assessment & Plan  Class 1 obesity due to excess calories with serious comorbidity and body mass index (BMI) of 34.0 to 34.9 in adult    Orders:    Referral to Endocrinology    semaglutide, weight loss, (Wegovy) 0.25  mg/0.5 mL pen injector; Inject 0.25 mg under the skin every 7 days.    Hypercalcemia    Orders:    Referral to Endocrinology    Parathyroid Hormone, Intact; Future    Vitamin D 25-Hydroxy,Total (for eval of Vitamin D levels); Future    Renal Function Panel; Future    Calcium, 24 Hour Urine; Future    Creatinine, 24 Hour Urine; Future    Hyperparathyroidism (Multi)    Orders:    Referral to Endocrinology    Parathyroid Hormone, Intact; Future    Vitamin D 25-Hydroxy,Total (for eval of Vitamin D levels); Future    Renal Function Panel; Future    Calcium, 24 Hour Urine; Future    Creatinine, 24 Hour Urine; Future    Multiple thyroid nodules    Orders:    US thyroid; Future    Hypothyroidism, unspecified type    Orders:    TSH with reflex to Free T4 if abnormal; Future  59-year-old here for evaluation of hyperparathyroidism hypothyroidism Ultiva thyroid nodules obesity.  We discussed her issues in detail:  Hyperparathyroidism: We discussed parameters for parathyroid intervention.  She did have a negative sestamibi scan however we discussed that this is typically used for surgical planning and not for determination of need for surgical therapy for parent hyperparathyroidism.  We reviewed her calcium values which have been very erratic and on last check were normal.  I would recommend a calcium vitamin D and a PTH.  She has also not had a 24-hour urine collection which I did order for her.  Her bone density was great earlier this year.  Further plans based on urine and blood testing    Hypothyroidism most recent TSH was low normal so we will recheck and adjust thyroid medication as needed    Multiple thyroid nodules: She did have thyroid nodules on her remaining gland so we will order thyroid ultrasound for her    Obesity: We discussed weight management and medication options.  She would like to see if Wegovy is covered through her insurance and we will call it in for her.  We will call her with her blood and urine test  results in the near future as well as her ultrasound findings and go from there as far as further evaluation and treatment      Electronically signed by:  Aliyah Galdamez MD 05/15/25  1:52 PM         [1]   Patient Active Problem List  Diagnosis    Anal symptoms    Attention-deficit/hyperactivity disorder    Biceps tendinitis of right upper extremity    Chronic right shoulder pain    Closed comedone    GERD (gastroesophageal reflux disease)    Hot flashes, menopausal    Lateral epicondylitis of right elbow    Low TSH level    Pain of left clavicle    Rhus dermatitis    Ventral hernia without obstruction or gangrene    Toxic multinodular goiter    Lung nodules    Benign essential HTN    Nicotine dependence, unspecified, uncomplicated    Toxic solitary thyroid nodule    ADHD    Obesity    Elevated blood pressure reading without diagnosis of hypertension   [2]   Past Medical History:  Diagnosis Date    Acute vaginitis 2018    Bacterial vaginosis    Dyshidrosis (pompholyx) 06/10/2015    Dyshidrotic eczema    Fever, unspecified 2021    Fever and chills    Nicotine dependence, unspecified, uncomplicated 01/15/2018    Tobacco dependence    Personal history of diseases of the skin and subcutaneous tissue 2015    History of acne    Personal history of other medical treatment     History of EKG    Personal history of other medical treatment     H/O mammogram    Personal history of other mental and behavioral disorders 06/10/2015    History of depression    Superficial mycosis, unspecified 2017    Fungal infection of skin    Urinary tract infection, site not specified 2019    Acute UTI   [3]   Past Surgical History:  Procedure Laterality Date    APPENDECTOMY  2014    Appendectomy    BREAST SURGERY Bilateral     breast lift     SECTION, CLASSIC  2022     Section    HERNIA REPAIR  2014    Hernia Repair x 3 umbillical    ROTATOR CUFF REPAIR  2014    Rotator Cuff  Repair    SINUS SURGERY  05/08/2014    Sinus Surgery    TONSILLECTOMY  02/23/2017    Tonsillectomy   [4]   Social History  Tobacco Use   Smoking Status Never    Passive exposure: Never   Smokeless Tobacco Never   [5]   Family History  Problem Relation Name Age of Onset    Breast cancer Mother  60    Other (venous thromboembolism) Mother      Prostate cancer Father      Other (venous thromboembolism) Brother      Breast cancer Mother's Sister  60   [6] No Known Allergies

## 2025-06-13 ENCOUNTER — PATIENT MESSAGE (OUTPATIENT)
Dept: PRIMARY CARE | Facility: CLINIC | Age: 60
End: 2025-06-13
Payer: COMMERCIAL

## 2025-06-13 DIAGNOSIS — F90.0 ATTENTION DEFICIT HYPERACTIVITY DISORDER (ADHD), PREDOMINANTLY INATTENTIVE TYPE: ICD-10-CM

## 2025-06-13 RX ORDER — DEXTROAMPHETAMINE SACCHARATE, AMPHETAMINE ASPARTATE MONOHYDRATE, DEXTROAMPHETAMINE SULFATE AND AMPHETAMINE SULFATE 5; 5; 5; 5 MG/1; MG/1; MG/1; MG/1
20 CAPSULE, EXTENDED RELEASE ORAL EVERY MORNING
Qty: 30 CAPSULE | Refills: 0 | Status: SHIPPED | OUTPATIENT
Start: 2025-06-13 | End: 2025-07-13

## 2025-06-17 ENCOUNTER — TELEPHONE (OUTPATIENT)
Dept: PRIMARY CARE | Facility: CLINIC | Age: 60
End: 2025-06-17
Payer: COMMERCIAL

## 2025-06-18 ENCOUNTER — OFFICE VISIT (OUTPATIENT)
Dept: PRIMARY CARE | Facility: CLINIC | Age: 60
End: 2025-06-18
Payer: COMMERCIAL

## 2025-06-18 VITALS
BODY MASS INDEX: 35.33 KG/M2 | DIASTOLIC BLOOD PRESSURE: 89 MMHG | HEART RATE: 86 BPM | RESPIRATION RATE: 16 BRPM | HEIGHT: 62 IN | WEIGHT: 192 LBS | SYSTOLIC BLOOD PRESSURE: 151 MMHG

## 2025-06-18 DIAGNOSIS — F17.210 CIGARETTE NICOTINE DEPENDENCE, UNCOMPLICATED: ICD-10-CM

## 2025-06-18 DIAGNOSIS — E83.52 HYPERCALCEMIA: ICD-10-CM

## 2025-06-18 DIAGNOSIS — E66.09 CLASS 1 OBESITY DUE TO EXCESS CALORIES WITH SERIOUS COMORBIDITY AND BODY MASS INDEX (BMI) OF 34.0 TO 34.9 IN ADULT: ICD-10-CM

## 2025-06-18 DIAGNOSIS — E66.811 CLASS 1 OBESITY DUE TO EXCESS CALORIES WITH SERIOUS COMORBIDITY AND BODY MASS INDEX (BMI) OF 34.0 TO 34.9 IN ADULT: ICD-10-CM

## 2025-06-18 DIAGNOSIS — E21.3 HYPERPARATHYROIDISM (MULTI): ICD-10-CM

## 2025-06-18 DIAGNOSIS — I10 BENIGN ESSENTIAL HTN: ICD-10-CM

## 2025-06-18 DIAGNOSIS — E89.0 POST-SURGICAL HYPOTHYROIDISM: Primary | ICD-10-CM

## 2025-06-18 PROCEDURE — 99214 OFFICE O/P EST MOD 30 MIN: CPT | Performed by: INTERNAL MEDICINE

## 2025-06-18 PROCEDURE — 3079F DIAST BP 80-89 MM HG: CPT | Performed by: INTERNAL MEDICINE

## 2025-06-18 PROCEDURE — 3077F SYST BP >= 140 MM HG: CPT | Performed by: INTERNAL MEDICINE

## 2025-06-18 PROCEDURE — 3008F BODY MASS INDEX DOCD: CPT | Performed by: INTERNAL MEDICINE

## 2025-06-18 RX ORDER — BISOPROLOL FUMARATE 5 MG/1
5 TABLET, FILM COATED ORAL DAILY
Qty: 30 TABLET | Refills: 2 | Status: SHIPPED | OUTPATIENT
Start: 2025-06-18

## 2025-06-18 ASSESSMENT — PAIN SCALES - GENERAL: PAINLEVEL_OUTOF10: 0-NO PAIN

## 2025-06-18 NOTE — PATIENT INSTRUCTIONS
Schedule thyroid ultrasound as ordered by Dr Galdamez.    Begin bisoprolol ( Zebeta) 5 mg one daily, can take at same time as losartan.     Schedule CT scan of lungs to continue screening for lung cancer.     See me again in 3 mo for follow up on blood pressure.     Get blood test done after fasting close to next visit .     Goal for protein is 100 gm protein .

## 2025-06-18 NOTE — PROGRESS NOTES
Subjective   Patient ID: Stephanie Jones is a 59 y.o. female who presents for follow up.    HPI   She reports being overall well today and is here for a follow up visit.    She notes that her thyroid medication was adjusted and that an ultrasound was ordered due to nodules on her thyroid.    Dicussed Wegovy with her endocrinologist which was denied by her insurance.  She notes feeling like she has lost some weight recently, but upon weighing herself today, she realized it was not the case.    She walks nightly with her dog, but suspects the exercise may not be vigorous enough to impact her weight.    BP looks elevated today at 151/89, which she feels is better than usual. States her diastolic has often been in 90s.     Latest Reference Range & Units 10/01/24 15:28 01/21/25 14:14   GLUCOSE 74 - 99 mg/dL 99 104 (H)   SODIUM 136 - 145 mmol/L 141 141   POTASSIUM 3.5 - 5.3 mmol/L 4.9 5.2   CHLORIDE 98 - 107 mmol/L 101 103   Bicarbonate 21 - 32 mmol/L 28 30   Anion Gap 10 - 20 mmol/L 17 13   Blood Urea Nitrogen 6 - 23 mg/dL 12 11   Creatinine 0.50 - 1.05 mg/dL 0.75 0.68   EGFR >60 mL/min/1.73m*2 >90 >90   Calcium 8.6 - 10.6 mg/dL 11.7 (H) 10.4   Albumin 3.4 - 5.0 g/dL 4.6 4.3   Alkaline Phosphatase 33 - 136 U/L 138 (H) 135   ALT 7 - 45 U/L 20 21   AST 9 - 39 U/L 18 18   Bilirubin Total 0.0 - 1.2 mg/dL 0.5 0.3   Total Protein 6.4 - 8.2 g/dL 7.6 7.1   Liver Fraction: 0 - 94 U/L 95 (H)    Bone Fraction: 0 - 55 U/L 72 (H)    Alkaline Phosphatase, Other U/L 0    Alkaline Phosphatase 40 - 120 U/L 167 (H)    Hemoglobin A1C See comment % 6.0 (H) 5.9 (H)   Estimated Average Glucose Not Established mg/dL 126 123   Parathyroid Hormone, Intact 18.5 - 88.0 pg/mL  121.6 (H)   Thyroxine, Free 0.78 - 1.48 ng/dL 1.23 1.38   Thyroid Stimulating Hormone 0.44 - 3.98 mIU/L 3.16 3.58   Vitamin D, 25-Hydroxy, Total 30 - 100 ng/mL  24 (L)   Vit D, 1,25-Dihydroxy 19.9 - 79.3 pg/mL  65.9   WBC 4.4 - 11.3 x10*3/uL 10.4    nRBC 0.0 - 0.0 /100 WBCs  "0.0    RBC 4.00 - 5.20 x10*6/uL 5.30 (H)    HEMOGLOBIN 12.0 - 16.0 g/dL 15.5    HEMATOCRIT 36.0 - 46.0 % 48.0 (H)    MCV 80 - 100 fL 91    MCH 26.0 - 34.0 pg 29.2    MCHC 32.0 - 36.0 g/dL 32.3    RED CELL DISTRIBUTION WIDTH 11.5 - 14.5 % 13.0    Platelets 150 - 450 x10*3/uL 404    Neutrophils % 40.0 - 80.0 % 51.3    Immature Granulocytes %, Automated 0.0 - 0.9 % 0.3    Lymphocytes % 13.0 - 44.0 % 39.4    Monocytes % 2.0 - 10.0 % 5.4    Eosinophils % 0.0 - 6.0 % 2.6    Basophils % 0.0 - 2.0 % 1.0    Neutrophils Absolute 1.20 - 7.70 x10*3/uL 5.31    Immature Granulocytes Absolute, Automated 0.00 - 0.70 x10*3/uL 0.03    Lymphocytes Absolute 1.20 - 4.80 x10*3/uL 4.08    Monocytes Absolute 0.10 - 1.00 x10*3/uL 0.56    Eosinophils Absolute 0.00 - 0.70 x10*3/uL 0.27    Basophils Absolute 0.00 - 0.10 x10*3/uL 0.10    (H): Data is abnormally high  (L): Data is abnormally low    Review of Systems    Current Medications[1]    Objective   /89   Pulse 86   Resp 16   Ht 1.575 m (5' 2\")   Wt 87.1 kg (192 lb)   BMI 35.12 kg/m²     Physical Exam  Constitutional:       Appearance: Normal appearance.   HENT:      Mouth/Throat:      Mouth: Mucous membranes are moist.      Pharynx: Oropharynx is clear.   Eyes:      Conjunctiva/sclera: Conjunctivae normal.      Pupils: Pupils are equal, round, and reactive to light.   Cardiovascular:      Rate and Rhythm: Normal rate and regular rhythm.      Heart sounds: Normal heart sounds.   Pulmonary:      Effort: Pulmonary effort is normal.      Breath sounds: Normal breath sounds.   Abdominal:      General: Bowel sounds are normal. There is no distension.      Palpations: Abdomen is soft. There is no mass.      Tenderness: There is no abdominal tenderness.   Lymphadenopathy:      Cervical: No cervical adenopathy.   Skin:     General: Skin is warm and dry.   Neurological:      General: No focal deficit present.         Assessment/Plan   Problem List Items Addressed This Visit          " Cardiac and Vasculature    Benign essential HTN    Continue taking losartan 100 mg daily          Relevant Medications    bisoprolol (Zebeta) 5 mg tablet    Other Relevant Orders    Lipid Panel       Endocrine/Metabolic    Obesity    Working with endocrinology, Ivone was denies by her [employer]         Post-surgical hypothyroidism - Primary    Now following with endocrinology and is undergoing workup          Hyperparathyroidism (Multi)    Now following with endocrinology and undergoing workup.            Genitourinary and Reproductive    Hypercalcemia    Now following with endocrinology and undergoing workup          Other Visit Diagnoses         Cigarette nicotine dependence, uncomplicated        Relevant Orders    CT lung screening low dose              Please return to see me in 3 months for a 30 minute follow up.     Scribe Attestation  By signing my name below, IShawna, Jun   attest that this documentation has been prepared under the direction and in the presence of Macey Almaguer DO.    Scribe Attestation  By signing my name below, Zeina BOATENG Scribe   attest that this documentation has been prepared under the direction and in the presence of Macey Almaguer DO.         [1]   Current Outpatient Medications:     amphetamine-dextroamphetamine XR (Adderall XR) 20 mg 24 hr capsule, Take 1 capsule (20 mg) by mouth once daily in the morning., Disp: 30 capsule, Rfl: 0    bisoprolol (Zebeta) 5 mg tablet, Take 1 tablet (5 mg) by mouth once daily., Disp: 30 tablet, Rfl: 2    levothyroxine (Synthroid, Levoxyl) 50 mcg tablet, Take 1 tablet (50 mcg) by mouth early in the morning.. Take on an empty stomach at the same time each day, either 30 to 60 minutes prior to breakfast . NEW DOSE, Disp: 90 tablet, Rfl: 1    losartan (Cozaar) 100 mg tablet, TAKE 1 TABLET BY MOUTH ONCE DAILY, Disp: 30 tablet, Rfl: 2    pantoprazole (ProtoNix) 40 mg EC tablet, TAKE 1 TABLET BY MOUTH ONCE DAILY IN THE MORNING. take  before meals.d o not crush, chew, split, Disp: 30 tablet, Rfl: 5    semaglutide, weight loss, (Wegovy) 0.25 mg/0.5 mL pen injector, Inject 0.25 mg under the skin every 7 days., Disp: 2 mL, Rfl: 0    terbinafine (LamISIL) 250 mg tablet, Take 1 tablet (250 mg) by mouth once daily., Disp: 30 tablet, Rfl: 5

## 2025-06-30 DIAGNOSIS — I10 BENIGN ESSENTIAL HTN: ICD-10-CM

## 2025-06-30 RX ORDER — LOSARTAN POTASSIUM 100 MG/1
100 TABLET ORAL DAILY
Qty: 30 TABLET | Refills: 2 | Status: SHIPPED | OUTPATIENT
Start: 2025-06-30

## 2025-07-10 ENCOUNTER — PATIENT MESSAGE (OUTPATIENT)
Dept: PRIMARY CARE | Facility: CLINIC | Age: 60
End: 2025-07-10
Payer: COMMERCIAL

## 2025-07-10 DIAGNOSIS — F90.0 ATTENTION DEFICIT HYPERACTIVITY DISORDER (ADHD), PREDOMINANTLY INATTENTIVE TYPE: ICD-10-CM

## 2025-07-10 RX ORDER — DEXTROAMPHETAMINE SACCHARATE, AMPHETAMINE ASPARTATE MONOHYDRATE, DEXTROAMPHETAMINE SULFATE AND AMPHETAMINE SULFATE 5; 5; 5; 5 MG/1; MG/1; MG/1; MG/1
20 CAPSULE, EXTENDED RELEASE ORAL EVERY MORNING
Qty: 30 CAPSULE | Refills: 0 | Status: SHIPPED | OUTPATIENT
Start: 2025-07-10 | End: 2025-08-09

## 2025-07-23 ENCOUNTER — OFFICE VISIT (OUTPATIENT)
Dept: URGENT CARE | Age: 60
End: 2025-07-23
Payer: COMMERCIAL

## 2025-07-23 VITALS
TEMPERATURE: 97.8 F | SYSTOLIC BLOOD PRESSURE: 149 MMHG | DIASTOLIC BLOOD PRESSURE: 86 MMHG | RESPIRATION RATE: 17 BRPM | WEIGHT: 190 LBS | BODY MASS INDEX: 34.75 KG/M2 | OXYGEN SATURATION: 98 % | HEART RATE: 64 BPM

## 2025-07-23 DIAGNOSIS — L25.5 CONTACT DERMATITIS DUE TO PLANTS, EXCEPT FOOD, UNSPECIFIED CONTACT DERMATITIS TYPE: Primary | ICD-10-CM

## 2025-07-23 RX ORDER — PREDNISONE 20 MG/1
TABLET ORAL
Qty: 10 TABLET | Refills: 0 | Status: SHIPPED | OUTPATIENT
Start: 2025-07-23

## 2025-07-23 RX ORDER — CETIRIZINE HYDROCHLORIDE 10 MG/1
10 TABLET ORAL DAILY
Qty: 30 TABLET | Refills: 0 | Status: SHIPPED | OUTPATIENT
Start: 2025-07-23 | End: 2025-08-22

## 2025-07-23 RX ORDER — TRIAMCINOLONE ACETONIDE 1 MG/G
CREAM TOPICAL 2 TIMES DAILY
Qty: 15 G | Refills: 0 | Status: SHIPPED | OUTPATIENT
Start: 2025-07-23

## 2025-07-23 NOTE — PROGRESS NOTES
Subjective   Patient ID: Stephanie Jones is a 59 y.o. female. They present today with a chief complaint of Poison Ivy.    Patient disposition: Home    HISTORY OF PRESENT ILLNESS:    This is an adult female with a PMH of poison ivy allergy and reactions presenting for suspected poison ivy rash. She did weeding 1 wk ago and gradually developed itchy blistery rash on both arms, chest, anterior neck and face. Left eye region is also affected and itchy and swollen but denies any involvement of the eye itself. Denies groin rash. She has only used Calamine lotion.    Past Medical History  Allergies as of 07/23/2025    (No Known Allergies)       Prescriptions Prior to Admission[1]     Medical History[2]    Surgical History[3]     reports that she has never smoked. She has never been exposed to tobacco smoke. She has never used smokeless tobacco. She reports current alcohol use of about 4.0 standard drinks of alcohol per week. She reports that she does not use drugs.    Review of Systems    Negative except as documented in the History of Present Illness.                             Objective    Vitals:    07/23/25 1802   BP: 149/86   Pulse: 64   Resp: 17   Temp: 36.6 °C (97.8 °F)   SpO2: 98%   Weight: 86.2 kg (190 lb)     No LMP recorded. Patient is postmenopausal.      PHYSICAL EXAMINATION:    CONSTITUTIONAL: well-appearing, nontoxic    EYES:  No scleral icterus or orbital trauma noted. No conjunctival injection. PERRLA. EOMI b/l. No nystagmus.    HEENT:  Airway is patent, nl oropharynx. Head and face are unremarkable and atraumatic. Mucous membranes moist. Nares are patent without copious rhinorrhea.  No lymphadenopathy. []    LUNGS:  CTAB, no r/r/w. No dullness to percussion. []    CARDIOVASCULAR:  RRR, no m/r/g. Nl S1/S2.    MUSCULOSKELETAL: No obvious deformities. LORENZO with equal strength. Gait normal.  []    ABDOMEN:  Normal BS x4 quadrants, no bruits heard. Nontender, nondistended, with no obvious masses, no  organomegaly and no peritoneal signs, rebound tenderness or guarding. Copeland's sign is negative, no McBurney's point tenderness. []    SKIN: There is a scattered raised vesicular rash on the BUE, anterior superior chest, anterior neck, and lesser on the face. There is no induration, increased calor, purulence. There are no hives. There are no petechiae/purpurae.    NEURO:  Normal baseline mental status. No obvious neurological deficits, normal sensation and strength bilateral upper and lower extremities. No tremor.     PSYCH: Appropriate mood and affect.         ---------------------------------------------------------------         MDM:  The patient had widespread plant contact dermatitis rash including face and periorbital involvement. We discussed treatment options. The potential side effects of corticosteroid use were discussed at length with the patient. These risks include but are not limited to: difficulty sleeping, increased appetite, fluid retention, mood changes, weight gain, change in blood pressure, high blood glucose, possible adrenal suppression, osteoporosis, avascular necrosis of the hip, menstrual irregularities (if applicable), increased risk of infection, and cataracts. The patient understands these risks and is willing to proceed with steroid therapy. Rx were provided, and she will fu here PRN if any signs of infxn or worsening sx develop. We discussed washing clothing, tools and pets.    The complexity of this visit was moderate because this was a new, previously undiagnosed medical problem and because Rx management was indicated, and because of the RISK of complications from this extensive poison ivy rash including infection and eye involvement.            Procedures    Diagnostic study results (if any) were reviewed by Jeremias Sandoval PA-C.    No results found for this visit on 07/23/25.     Assessment/Plan   Allergies, medications, history, and pertinent labs/EKGs/Imaging reviewed by Jeremias OLSON  TIERNEY Sandoval.     Orders and Diagnoses  There are no diagnoses linked to this encounter.    Medical Admin Record      Follow Up Instructions  No follow-ups on file.    Electronically signed by Jeremias Sandoval PA-C  6:22 PM         [1] (Not in a hospital admission)  [2]   Past Medical History:  Diagnosis Date    Acute vaginitis 2018    Bacterial vaginosis    Dyshidrosis (pompholyx) 06/10/2015    Dyshidrotic eczema    Fever, unspecified 2021    Fever and chills    Nicotine dependence, unspecified, uncomplicated 01/15/2018    Tobacco dependence    Personal history of diseases of the skin and subcutaneous tissue 2015    History of acne    Personal history of other medical treatment     History of EKG    Personal history of other medical treatment     H/O mammogram    Personal history of other mental and behavioral disorders 06/10/2015    History of depression    Superficial mycosis, unspecified 2017    Fungal infection of skin    Urinary tract infection, site not specified 2019    Acute UTI   [3]   Past Surgical History:  Procedure Laterality Date    APPENDECTOMY  2014    Appendectomy    BREAST SURGERY Bilateral     breast lift     SECTION, CLASSIC  2022     Section    HERNIA REPAIR  2014    Hernia Repair x 3 umbillical    ROTATOR CUFF REPAIR  2014    Rotator Cuff Repair    SINUS SURGERY  2014    Sinus Surgery    TONSILLECTOMY  2017    Tonsillectomy

## 2025-07-30 ENCOUNTER — APPOINTMENT (OUTPATIENT)
Dept: ENDOCRINOLOGY | Facility: CLINIC | Age: 60
End: 2025-07-30
Payer: COMMERCIAL

## 2025-08-14 ENCOUNTER — PATIENT MESSAGE (OUTPATIENT)
Dept: PRIMARY CARE | Facility: CLINIC | Age: 60
End: 2025-08-14
Payer: COMMERCIAL

## 2025-08-14 DIAGNOSIS — F90.0 ATTENTION DEFICIT HYPERACTIVITY DISORDER (ADHD), PREDOMINANTLY INATTENTIVE TYPE: ICD-10-CM

## 2025-08-14 RX ORDER — DEXTROAMPHETAMINE SACCHARATE, AMPHETAMINE ASPARTATE MONOHYDRATE, DEXTROAMPHETAMINE SULFATE AND AMPHETAMINE SULFATE 5; 5; 5; 5 MG/1; MG/1; MG/1; MG/1
20 CAPSULE, EXTENDED RELEASE ORAL EVERY MORNING
Qty: 30 CAPSULE | Refills: 0 | Status: SHIPPED | OUTPATIENT
Start: 2025-08-14 | End: 2025-09-14

## 2025-09-18 ENCOUNTER — APPOINTMENT (OUTPATIENT)
Dept: PRIMARY CARE | Facility: CLINIC | Age: 60
End: 2025-09-18
Payer: COMMERCIAL

## (undated) DEVICE — PAD, GROUNDING, ELECTROSURGICAL, W/9 FT CABLE, POLYHESIVE II, ADULT, LF

## (undated) DEVICE — DISSECTOR, EXACT, LIGASURE

## (undated) DEVICE — DRAPE, INSTRUMENT, W/POUCH, STERI DRAPE, 7 X 11 IN, DISPOSABLE, STERILE

## (undated) DEVICE — HEMOSTAT, ARISTA, ABSORBABLE, 3 GRAMS

## (undated) DEVICE — DRESSING, ADHESIVE, ISLAND, TELFA, 2 X 3.75 IN, LF

## (undated) DEVICE — SLEEVE, SURGICAL, 21.5 X 5.5 IN, LF, STERILE

## (undated) DEVICE — MANIFOLD, 4 PORT NEPTUNE STANDARD

## (undated) DEVICE — SUTURE, SILK, 2-0, 18 IN, BLACK

## (undated) DEVICE — TOWEL, SURGICAL, NEURO, O/R, 16 X 26, BLUE, STERILE

## (undated) DEVICE — ELECTRODE, ELECTROSURGICAL, BLADE, INSULATED, ENT/IMA, STERILE

## (undated) DEVICE — DRAPE, SHEET, THYROID, W/ARMBOARD COVER, 100 X 121 IN, DISPOSABLE, LF, STERILE

## (undated) DEVICE — GLOVE, SURGICAL, PROTEXIS MICRO, 7.5, PF, LATEX

## (undated) DEVICE — DRESSING, NON-ADHERENT, TELFA, OUCHLESS, 3 X 8 IN, STERILE

## (undated) DEVICE — SPONGE, GAUZE, XRAY DECT, 16 PLY, 4 X 4, W/MASTER DMT,STERILE

## (undated) DEVICE — CLIP, LIGATING, W/ADHESIVE, WIDE SLOT, SMALL, TITANIUM

## (undated) DEVICE — TRAY, MINOR, SINGLE BASIN, STERILE

## (undated) DEVICE — COUNTER, NEEDLE, FOAM BLOCK, W/MAGNET, W/BLADE GUARD, 10 COUNT, RED, LF

## (undated) DEVICE — GLOVE, SURGICAL, PROTEXIS NEOPRENE, 7.5, PF, LF

## (undated) DEVICE — SUTURE, VICRYL, 3-0, 27 IN, SH

## (undated) DEVICE — SPONGE, DISSECTOR, PEANUT, 3/8, STERILE 5 FOAM HOLDER"

## (undated) DEVICE — SUTURE, PROLENE, 5-0, 36 IN, C-1, CV-11, BLUE

## (undated) DEVICE — SUTURE, MONOCRYLIC, 4-0, P3, MONO 18

## (undated) DEVICE — COVER, TABLE, UHC

## (undated) DEVICE — COVER, CART, 45 X 27 X 48 IN, CLEAR

## (undated) DEVICE — DRAPE, PAD, INSTRUMENT, MAGNETIC, MEDIUM, 10 X 16 IN, DISPOSABLE

## (undated) DEVICE — Device

## (undated) DEVICE — APPLICATOR, PREP, CHLORAPREP, W/ORANGE TINT, 10.5ML

## (undated) DEVICE — SUTURE, SILK, 3-0, 18 IN, MULTIPACK, BLACK

## (undated) DEVICE — CLIP, LIGATING, W/ADHESIVE PAD, MEDIUM, TITANIUM

## (undated) DEVICE — ADHESIVE, SKIN, MASTISOL, 2/3 CC VIAL

## (undated) DEVICE — NEEDLE, HYPODERMIC, REGULAR WALL, REGULAR BEVEL, 22 G X 1 IN

## (undated) DEVICE — STRIP, SKIN CLOSURE, STERI STRIP, REINFORCED, 0.5 X 4 IN